# Patient Record
Sex: MALE | Race: WHITE | NOT HISPANIC OR LATINO | Employment: OTHER | URBAN - METROPOLITAN AREA
[De-identification: names, ages, dates, MRNs, and addresses within clinical notes are randomized per-mention and may not be internally consistent; named-entity substitution may affect disease eponyms.]

---

## 2017-01-17 DIAGNOSIS — E78.5 HYPERLIPIDEMIA LDL GOAL <100: Primary | ICD-10-CM

## 2017-01-17 NOTE — TELEPHONE ENCOUNTER
atorvastatin      Last Written Prescription Date: 11/09/2015  Last Fill Quantity: 90, # refills: 3  Last Office Visit with G, P or [facility] prescribing provider: 08/24/2016       CHOL      216   11/9/2015  HDL       37   11/9/2015  LDL   Cannot estimate LDL when triglyceride exceeds 400 mg/dL   11/9/2015  LDL      111   11/9/2015  TRIG      523   11/9/2015  CHOLHDLRATIO      5.8   11/9/2015

## 2017-01-20 RX ORDER — ATORVASTATIN CALCIUM 20 MG/1
20 TABLET, FILM COATED ORAL DAILY
Qty: 90 TABLET | Refills: 0 | Status: SHIPPED | OUTPATIENT
Start: 2017-01-20 | End: 2017-05-17

## 2017-02-06 DIAGNOSIS — E11.9 TYPE 2 DIABETES MELLITUS WITHOUT COMPLICATIONS (H): Primary | ICD-10-CM

## 2017-02-06 NOTE — TELEPHONE ENCOUNTER
metformin         Last Written Prescription Date: 01/06/2016  Last Fill Quantity: 120, # refills: 12  Last Office Visit with G, P or [facility] prescribing provider:  08/24/2016        BP Readings from Last 3 Encounters:   08/24/16 108/74   04/13/16 113/76   11/09/15 109/68     MICROL       28   4/13/2016  No results found for this basename: microalbumin  CREATININE   Date Value Ref Range Status   11/09/2015 0.55* 0.66 - 1.25 mg/dL Final   ]  GFR ESTIMATE   Date Value Ref Range Status   11/09/2015 >90  Non  GFR Calc   >60 mL/min/1.7m2 Final   09/23/2013 >90 >60 mL/min/1.7m2 Final   12/21/2011 >90 >60 mL/min/1.7m2 Final     GFR ESTIMATE IF BLACK   Date Value Ref Range Status   11/09/2015 >90   GFR Calc   >60 mL/min/1.7m2 Final   09/23/2013 >90 >60 mL/min/1.7m2 Final   12/21/2011 >90 >60 mL/min/1.7m2 Final     CHOL      216   11/9/2015  HDL       37   11/9/2015  LDL   Cannot estimate LDL when triglyceride exceeds 400 mg/dL   11/9/2015  LDL      111   11/9/2015  TRIG      523   11/9/2015  CHOLHDLRATIO      5.8   11/9/2015  AST       36   4/20/2006  ALT       44   9/2/2014  A1C      8.1   8/24/2016  A1C      8.3   4/13/2016  A1C     11.2   11/9/2015  A1C      8.0   9/2/2014  A1C      7.7   1/24/2014  POTASSIUM   Date Value Ref Range Status   11/09/2015 3.8 3.4 - 5.3 mmol/L Final       Jonah LOW (R)

## 2017-02-07 RX ORDER — METFORMIN HCL 500 MG
1000 TABLET, EXTENDED RELEASE 24 HR ORAL 2 TIMES DAILY WITH MEALS
Qty: 120 TABLET | Refills: 1 | Status: SHIPPED | OUTPATIENT
Start: 2017-02-07 | End: 2017-05-18

## 2017-02-07 NOTE — TELEPHONE ENCOUNTER
Medication is being filled for 1 time refill only due to:  Refill given to cover until appt in Feb 2017. [email or web address]

## 2017-02-15 DIAGNOSIS — E11.9 TYPE 2 DIABETES MELLITUS WITHOUT COMPLICATIONS (H): Primary | ICD-10-CM

## 2017-02-15 NOTE — TELEPHONE ENCOUNTER
Blood glucose monitoring (NO BRAND SPECIFIED) test strip      Last Written Prescription Date: 01/06/2016  Last Fill Quantity: 120, # refills: 12  Last Office Visit with G, P or [facility] prescribing provider:  08/24/2016        BP Readings from Last 3 Encounters:   08/24/16 108/74   04/13/16 113/76   11/09/15 109/68     Lab Results   Component Value Date    MICROL 28 04/13/2016     No results found for: MICROALBUMIN  Creatinine   Date Value Ref Range Status   11/09/2015 0.55 (L) 0.66 - 1.25 mg/dL Final   ]  GFR Estimate   Date Value Ref Range Status   11/09/2015 >90  Non  GFR Calc   >60 mL/min/1.7m2 Final   09/23/2013 >90 >60 mL/min/1.7m2 Final   12/21/2011 >90 >60 mL/min/1.7m2 Final     GFR Estimate If Black   Date Value Ref Range Status   11/09/2015 >90   GFR Calc   >60 mL/min/1.7m2 Final   09/23/2013 >90 >60 mL/min/1.7m2 Final   12/21/2011 >90 >60 mL/min/1.7m2 Final     Lab Results   Component Value Date    CHOL 216 11/09/2015     Lab Results   Component Value Date    HDL 37 11/09/2015     Lab Results   Component Value Date    LDL  11/09/2015     Cannot estimate LDL when triglyceride exceeds 400 mg/dL     11/09/2015     Lab Results   Component Value Date    TRIG 523 11/09/2015     Lab Results   Component Value Date    CHOLHDLRATIO 5.8 11/09/2015     Lab Results   Component Value Date    AST 36 04/20/2006     Lab Results   Component Value Date    ALT 44 09/02/2014     Lab Results   Component Value Date    A1C 8.1 08/24/2016    A1C 8.3 04/13/2016    A1C 11.2 11/09/2015    A1C 8.0 09/02/2014    A1C 7.7 01/24/2014     Potassium   Date Value Ref Range Status   11/09/2015 3.8 3.4 - 5.3 mmol/L Final

## 2017-02-21 NOTE — TELEPHONE ENCOUNTER
Prescription approved per [facility] [place].  kPavelRN

## 2017-04-25 ENCOUNTER — TELEPHONE (OUTPATIENT)
Dept: OTHER | Facility: CLINIC | Age: 42
End: 2017-04-25

## 2017-04-25 NOTE — TELEPHONE ENCOUNTER
4/25/2017    Call Regarding Onboarding [employer]    Attempt 1    Message     Comments: spouse and 1 child    Patient busy ok to call spouse per patient      Outreach   cnt

## 2017-05-01 NOTE — TELEPHONE ENCOUNTER
"5/1/2017          Patient busy declined Onboarding offer, will call Healthcare insurance if need help    Can \"Evette\" Quoc  Central Scheduler        "

## 2017-05-15 ENCOUNTER — TELEPHONE (OUTPATIENT)
Dept: FAMILY MEDICINE | Facility: CLINIC | Age: 42
End: 2017-05-15

## 2017-05-15 DIAGNOSIS — E78.5 HYPERLIPIDEMIA LDL GOAL <100: ICD-10-CM

## 2017-05-15 DIAGNOSIS — E11.9 TYPE 2 DIABETES MELLITUS WITHOUT COMPLICATIONS (H): Primary | ICD-10-CM

## 2017-05-15 DIAGNOSIS — R94.5 NONSPECIFIC ABNORMAL RESULTS OF LIVER FUNCTION STUDY: ICD-10-CM

## 2017-05-15 NOTE — TELEPHONE ENCOUNTER
Reason for Call: Request for an order or referral:    Order or referral being requested: Pt is coming in 05/16/17 for labs and orders need to be placed please.  Pt also needs to know if he should be fasting?      Date needed: at your convenience    Has the patient been seen by the PCP for this problem? NO    Additional comments:     Phone number Patient can be reached at:  Cell number on file:    Telephone Information:   Mobile [phone]     Best Time:  any    Can we leave a detailed message on this number?  YES    Call taken on 5/15/2017 at 1:16 PM by Gloria Cherry

## 2017-05-16 DIAGNOSIS — R94.5 NONSPECIFIC ABNORMAL RESULTS OF LIVER FUNCTION STUDY: ICD-10-CM

## 2017-05-16 DIAGNOSIS — E78.5 HYPERLIPIDEMIA LDL GOAL <100: ICD-10-CM

## 2017-05-16 DIAGNOSIS — E11.9 TYPE 2 DIABETES MELLITUS WITHOUT COMPLICATIONS (H): ICD-10-CM

## 2017-05-16 LAB
ALBUMIN SERPL-MCNC: 3.9 G/DL (ref 3.4–5)
ALP SERPL-CCNC: 95 U/L (ref 40–150)
ALT SERPL W P-5'-P-CCNC: 45 U/L (ref 0–70)
ANION GAP SERPL CALCULATED.3IONS-SCNC: 7 MMOL/L (ref 3–14)
AST SERPL W P-5'-P-CCNC: 26 U/L (ref 0–45)
BILIRUB SERPL-MCNC: 0.5 MG/DL (ref 0.2–1.3)
BUN SERPL-MCNC: 11 MG/DL (ref 7–30)
CALCIUM SERPL-MCNC: 8.7 MG/DL (ref 8.5–10.1)
CHLORIDE SERPL-SCNC: 101 MMOL/L (ref 94–109)
CHOLEST SERPL-MCNC: 210 MG/DL
CO2 SERPL-SCNC: 27 MMOL/L (ref 20–32)
CREAT SERPL-MCNC: 0.57 MG/DL (ref 0.66–1.25)
CREAT UR-MCNC: 214 MG/DL
GFR SERPL CREATININE-BSD FRML MDRD: ABNORMAL ML/MIN/1.7M2
GLUCOSE SERPL-MCNC: 248 MG/DL (ref 70–99)
HBA1C MFR BLD: 9.1 % (ref 4.3–6)
HDLC SERPL-MCNC: 32 MG/DL
LDLC SERPL CALC-MCNC: ABNORMAL MG/DL
LDLC SERPL DIRECT ASSAY-MCNC: 81 MG/DL
MICROALBUMIN UR-MCNC: 20 MG/L
MICROALBUMIN/CREAT UR: 9.11 MG/G CR (ref 0–17)
NONHDLC SERPL-MCNC: 178 MG/DL
POTASSIUM SERPL-SCNC: 3.6 MMOL/L (ref 3.4–5.3)
PROT SERPL-MCNC: 6.6 G/DL (ref 6.8–8.8)
SODIUM SERPL-SCNC: 135 MMOL/L (ref 133–144)
TRIGL SERPL-MCNC: 740 MG/DL

## 2017-05-16 PROCEDURE — 80061 LIPID PANEL: CPT | Performed by: FAMILY MEDICINE

## 2017-05-16 PROCEDURE — 80053 COMPREHEN METABOLIC PANEL: CPT | Performed by: FAMILY MEDICINE

## 2017-05-16 PROCEDURE — 82043 UR ALBUMIN QUANTITATIVE: CPT | Performed by: FAMILY MEDICINE

## 2017-05-16 PROCEDURE — 36415 COLL VENOUS BLD VENIPUNCTURE: CPT | Performed by: FAMILY MEDICINE

## 2017-05-16 PROCEDURE — 83721 ASSAY OF BLOOD LIPOPROTEIN: CPT | Mod: 59 | Performed by: FAMILY MEDICINE

## 2017-05-16 PROCEDURE — 83036 HEMOGLOBIN GLYCOSYLATED A1C: CPT | Performed by: FAMILY MEDICINE

## 2017-05-17 ENCOUNTER — OFFICE VISIT (OUTPATIENT)
Dept: FAMILY MEDICINE | Facility: CLINIC | Age: 42
End: 2017-05-17
Payer: COMMERCIAL

## 2017-05-17 VITALS
HEIGHT: 65 IN | DIASTOLIC BLOOD PRESSURE: 76 MMHG | SYSTOLIC BLOOD PRESSURE: 119 MMHG | TEMPERATURE: 98.2 F | BODY MASS INDEX: 27.82 KG/M2 | RESPIRATION RATE: 18 BRPM | WEIGHT: 167 LBS | HEART RATE: 77 BPM

## 2017-05-17 DIAGNOSIS — E78.5 HYPERLIPIDEMIA LDL GOAL <100: ICD-10-CM

## 2017-05-17 DIAGNOSIS — E11.9 TYPE 2 DIABETES MELLITUS WITHOUT COMPLICATION, WITHOUT LONG-TERM CURRENT USE OF INSULIN (H): ICD-10-CM

## 2017-05-17 PROCEDURE — 99214 OFFICE O/P EST MOD 30 MIN: CPT | Performed by: FAMILY MEDICINE

## 2017-05-17 RX ORDER — GLIPIZIDE 10 MG/1
10 TABLET, FILM COATED, EXTENDED RELEASE ORAL DAILY
Qty: 90 TABLET | Refills: 3 | Status: SHIPPED | OUTPATIENT
Start: 2017-05-17 | End: 2017-11-10

## 2017-05-17 RX ORDER — ATORVASTATIN CALCIUM 20 MG/1
20 TABLET, FILM COATED ORAL DAILY
Qty: 90 TABLET | Refills: 3 | Status: SHIPPED | OUTPATIENT
Start: 2017-05-17 | End: 2017-11-10

## 2017-05-17 NOTE — PROGRESS NOTES
SUBJECTIVE:                                                    Aubrey Ochoa is a 41 year old male who presents to clinic today for the following health issues:      Diabetes Follow-up      Patient is checking blood sugars: not at all    Diabetic concerns: None     Symptoms of hypoglycemia (low blood sugar): none     Paresthesias (numbness or burning in feet) or sores: No     Date of last diabetic eye exam: NO     Hyperlipidemia Follow-Up      Rate your low fat/cholesterol diet?: good    Taking statin?  Yes, no muscle aches from statin    Other lipid medications/supplements?:  none       Amount of exercise or physical activity: None    Problems taking medications regularly: No    Medication side effects: none    Diet: carbohydrate counting          Problem list and histories reviewed & adjusted, as indicated.  Additional history:         Reviewed and updated as needed this visit by clinical staff  Tobacco  Allergies  Meds       Reviewed and updated as needed this visit by Provider      Further history obtained, clarified or corrected by physician:  His diabetes is uncontrolled again. He is having trouble affording test strips. He has said that his diet has worsened in the last few months.    OBJECTIVE:  LUNGS: clear to auscultation, normal breath sounds  CV: RRR without murmur  ABD: BS+, soft, nontender, no masses, no hepatosplenomegaly  EXTREMITIES: without joint tenderness, swelling or erythema.  No muscle tenderness or abnormality.  SKIN: No rashes or abnormalities  NEURO:non focal exam    ASSESSMENT:     Hyperlipidemia LDL goal <100  Type 2 diabetes mellitus without complication, without long-term current use of insulin (H)    PLAN:  Orders Placed This Encounter     DIABETES EDUCATOR REFERRAL     atorvastatin (LIPITOR) 20 MG tablet     glipiZIDE (GLIPIZIDE XL) 10 MG 24 hr tablet     He needs to adjust his lifestyle some but likely he needs change in medication and perhaps consider insulin so we will have him  see the diabetic educator

## 2017-05-17 NOTE — MR AVS SNAPSHOT
After Visit Summary   5/17/2017    Aubrey Ochoa    MRN: [id number]           Patient Information     Date Of Birth          1975        Visit Information        Provider Department      5/17/2017 7:40 AM Waqar Reddy MD [facility]        Today's Diagnoses     Hyperlipidemia LDL goal <100        Type 2 diabetes mellitus without complication, without long-term current use of insulin (H)          Care Instructions          Thank you for choosing [facility] [place].  You may be receiving a survey in the mail from [employer] regarding your visit today.  Please take a few minutes to complete and return the survey to let us know how we are doing.      Our Clinic hours are:  Mondays    7:20 am - 7 pm  Tues -  Fri  7:20 am - 5 pm    Clinic Phone: [phone]    The clinic lab opens at 7:30 am Mon - Fri and appointments are required.    [facility]. [phone]  Monday-Thursday 8 am - 7pm  Tues/Wed/Fri 8 am - 5:30 pm               Follow-ups after your visit        Additional Services     DIABETES EDUCATOR REFERRAL       DIABETES SELF MANAGEMENT TRAINING (DSMT)      Your provider has referred you to Diabetes Education: [facility]: Diabetes Education - All [facility] [place] [phone]   [email or web address]    Type of training and number of hours: Previous Diagnosis: Follow-up DSMT - 2 hours.    Medicare covers: 10 hours of initial DSMT in 12 month period from the time of first visit, plus 2 hours of follow-up DSMT annually, and additional hours as requested for insulin training.    Diabetes Type: Type 2 - On Oral Medication             Diabetes Co-Morbidities:                A1C Goal:  <7.0       A1C is: Lab Results       Component                Value               Date                       A1C                      9.1                 05/16/2017              If an urgent visit is needed or A1C is above 12, Care Team to  call the Diabetes Education Team at [phone] or send an In Basket message to the Diabetes Education Pool (P DIAB ED-PATIENT CARE).    Diabetes Education Topics: Medication Start:     Special Educational Needs Requiring Individual DSMT:        MEDICAL NUTRITION THERAPY (MNT) for Diabetes    Medical Nutrition Therapy with a Registered Dietitian can be provided in coordination with Diabetes Self-Management Training to assist in achieving optimal diabetes management.    MNT Type and Hours:                        Medicare will cover: 3 hours initial MNT in 12 month period after first visit, plus 2 hours of follow-up MNT annually    Please be aware that coverage of these services is subject to the terms and limitations of your health insurance plan.  Call member services at your health plan to determine Diabetes Self-Management Training benefits and ask which blood glucose monitor brands are covered by your plan.      Please bring the following with you to your appointment:    (1)  List of current medications   (2)  List of Blood Glucose Monitor brands that are covered by your insurance plan  (3)  Blood Glucose Monitor and log book  (4)   Food records for the 3 days prior to your visit    The Certified Diabetes Educator may make diabetes medication adjustments per the CDE Protocol and Collaborative Practice Agreement.                  Who to contact     If you have questions or need follow up information about today's clinic visit or your schedule please contact [facility] directly at [phone].  Normal or non-critical lab and imaging results will be communicated to you by [employer], letter or phone within 4 business days after the clinic has received the results. If you do not hear from us within 7 days, please contact the clinic through [employer] or phone. If you have a critical or abnormal lab result, we will notify you by phone as soon as possible.  Submit refill requests through [employer] or  "call your pharmacy and they will forward the refill request to us. Please allow 3 business days for your refill to be completed.          Additional Information About Your Visit        Social & [employer] Information     [employer] gives you secure access to your electronic health record. If you see a primary care provider, you can also send messages to your care team and make appointments. If you have questions, please call your primary care clinic.  If you do not have a primary care provider, please call [phone] and they will assist you.        Care EveryWhere ID     This is your Care EveryWhere ID. This could be used by other organizations to access your [place] medical records  [id number]        Your Vitals Were     Pulse Temperature Respirations Height BMI (Body Mass Index)       77 98.2  F (36.8  C) 18 5' 5.25\" (1.657 m) 27.58 kg/m2        Blood Pressure from Last 3 Encounters:   05/17/17 119/76   08/24/16 108/74   04/13/16 113/76    Weight from Last 3 Encounters:   05/17/17 167 lb (75.8 kg)   08/24/16 160 lb (72.6 kg)   04/13/16 165 lb (74.8 kg)              We Performed the Following     DIABETES EDUCATOR REFERRAL          Today's Medication Changes          These changes are accurate as of: 5/17/17 11:03 AM.  If you have any questions, ask your nurse or doctor.               These medicines have changed or have updated prescriptions.        Dose/Directions    atorvastatin 20 MG tablet   Commonly known as:  LIPITOR   This may have changed:  additional instructions   Used for:  Hyperlipidemia LDL goal <100   Changed by:  Waqar Reddy MD        Dose:  20 mg   Take 1 tablet (20 mg) by mouth daily   Quantity:  90 tablet   Refills:  3            Where to get your medicines      These medications were sent to [facility] [address] [place] [address] - [address]   [address] [facility] [address]     Phone:  [phone]     atorvastatin 20 MG tablet    glipiZIDE 10 MG 24 hr tablet                Primary " Care Provider Office Phone # Fax #    Waqar Reddy [phone]       [facility] [address]  [facility] [address]        Thank you!     Thank you for choosing [facility]  for your care. Our goal is always to provide you with excellent care. Hearing back from our patients is one way we can continue to improve our services. Please take a few minutes to complete the written survey that you may receive in the mail after your visit with us. Thank you!             Your Updated Medication List - Protect others around you: Learn how to safely use, store and throw away your medicines at www.disposemymeds.org.          This list is accurate as of: 5/17/17 11:03 AM.  Always use your most recent med list.                   Brand Name Dispense Instructions for use    atorvastatin 20 MG tablet    LIPITOR    90 tablet    Take 1 tablet (20 mg) by mouth daily       * blood glucose monitoring lancets     1 Box    Use to test blood sugars 1 times daily or as directed.       * blood glucose monitoring lancets     102 Box    1 each daily Use to test blood sugar 1 times daily or as directed.       * blood glucose monitoring test strip    no brand specified    100 each    Use to test blood sugar 2 times daily or as directed.       * blood glucose monitoring test strip    ACCU-CHEK SMARTVIEW    120 each    Use to test blood sugar 4 times daily or as directed.       glipiZIDE 10 MG 24 hr tablet    glipiZIDE XL    90 tablet    Take 1 tablet (10 mg) by mouth daily       metFORMIN 500 MG 24 hr tablet    GLUCOPHAGE-XR    120 tablet    Take 2 tablets (1,000 mg) by mouth 2 times daily (with meals) A1C DUE 2/2017       order for DME     1 Device    Superfeet inserts size 9       * Notice:  This list has 4 medication(s) that are the same as other medications prescribed for you. Read the directions carefully, and ask your doctor or other care provider to review them with you.

## 2017-05-17 NOTE — PATIENT INSTRUCTIONS
Thank you for choosing [facility].  You may be receiving a survey in the mail from [facility] regarding your visit today.  Please take a few minutes to complete and return the survey to let us know how we are doing.      Our Clinic hours are:  Mondays    7:20 am - 7 pm  Tues -  Fri  7:20 am - 5 pm    Clinic Phone: [phone]    The clinic lab opens at 7:30 am Mon - Fri and appointments are required.    [facility]. [phone]  Monday-Thursday 8 am - 7pm  Tues/Wed/Fri 8 am - 5:30 pm

## 2017-05-18 DIAGNOSIS — E11.9 TYPE 2 DIABETES MELLITUS WITHOUT COMPLICATIONS (H): ICD-10-CM

## 2017-05-18 RX ORDER — METFORMIN HCL 500 MG
1000 TABLET, EXTENDED RELEASE 24 HR ORAL 2 TIMES DAILY WITH MEALS
Qty: 120 TABLET | Refills: 2 | Status: SHIPPED | OUTPATIENT
Start: 2017-05-18 | End: 2017-09-27

## 2017-05-18 NOTE — TELEPHONE ENCOUNTER
Metformin        Last Written Prescription Date: 02/07/17  Last Fill Quantity: 120, # refills: 1  [place] [facility] prescribing provider:  05/17/17        BP Readings from Last 3 Encounters:   05/17/17 119/76   08/24/16 108/74   04/13/16 113/76     Lab Results   Component Value Date    MICROL 20 05/16/2017     Lab Results   Component Value Date    UMALCR 9.11 05/16/2017     Creatinine   Date Value Ref Range Status   05/16/2017 0.57 (L) 0.66 - 1.25 mg/dL Final   ]  GFR Estimate   Date Value Ref Range Status   05/16/2017 >90  Non  GFR Calc   >60 mL/min/1.7m2 Final   11/09/2015 >90  Non  GFR Calc   >60 mL/min/1.7m2 Final   09/23/2013 >90 >60 mL/min/1.7m2 Final     GFR Estimate If Black   Date Value Ref Range Status   05/16/2017 >90   GFR Calc   >60 mL/min/1.7m2 Final   11/09/2015 >90   GFR Calc   >60 mL/min/1.7m2 Final   09/23/2013 >90 >60 mL/min/1.7m2 Final     Lab Results   Component Value Date    CHOL 210 05/16/2017     Lab Results   Component Value Date    HDL 32 05/16/2017     Lab Results   Component Value Date    LDL  05/16/2017     Cannot estimate LDL when triglyceride exceeds 400 mg/dL    LDL 81 05/16/2017     Lab Results   Component Value Date    TRIG 740 05/16/2017     Lab Results   Component Value Date    CHOLHDLRATIO 5.8 11/09/2015     Lab Results   Component Value Date    AST 26 05/16/2017     Lab Results   Component Value Date    ALT 45 05/16/2017     Lab Results   Component Value Date    A1C 9.1 05/16/2017    A1C 8.1 08/24/2016    A1C 8.3 04/13/2016    A1C 11.2 11/09/2015    A1C 8.0 09/02/2014     Potassium   Date Value Ref Range Status   05/16/2017 3.6 3.4 - 5.3 mmol/L Final

## 2017-07-05 ENCOUNTER — TELEPHONE (OUTPATIENT)
Dept: EDUCATION SERVICES | Facility: CLINIC | Age: 42
End: 2017-07-05

## 2017-07-05 ENCOUNTER — ALLIED HEALTH/NURSE VISIT (OUTPATIENT)
Dept: EDUCATION SERVICES | Facility: CLINIC | Age: 42
End: 2017-07-05
Payer: COMMERCIAL

## 2017-07-05 DIAGNOSIS — E11.9 TYPE 2 DIABETES MELLITUS WITHOUT COMPLICATION, WITHOUT LONG-TERM CURRENT USE OF INSULIN (H): Primary | ICD-10-CM

## 2017-07-05 DIAGNOSIS — E11.9 DIABETES MELLITUS WITHOUT COMPLICATION (H): ICD-10-CM

## 2017-07-05 PROCEDURE — G0108 DIAB MANAGE TRN  PER INDIV: HCPCS

## 2017-07-05 RX ORDER — LIRAGLUTIDE 6 MG/ML
1.2 INJECTION SUBCUTANEOUS DAILY
Qty: 6 ML | Refills: 1 | Status: SHIPPED | OUTPATIENT
Start: 2017-07-05 | End: 2020-08-31

## 2017-07-05 NOTE — PATIENT INSTRUCTIONS
My Diabetes Care Goals:    Healthy Eating: mindful of the portions of your meals and timing, to try to get proteins all meals and work on the carb choices.  Increase fibers, fruits, veggies, mixed nuts.      Monitoring:  Check every morning.    Medications:  Metformin  mg take all 4 pills   Glipizide XL 10 mg take daily with breakfast.   Trulicity 0.75 mg if covered take once weekly.    Victoza 1.2 mg .  Start out with 0.6 mg daily X 7 days then on day 8 move to 1.2 mg and stay there.      Follow up:  Follow-up diabetes education appointment scheduled on Wed. Aug. 9 @ 3:00.      Bring blood glucose meter and logbook with you to all doctor and follow-up appointments.     [place] [facility] for the [facility]:  For Your Diabetes Education and Nutrition Appointments Call:  [phone]   For Diabetes Education or Nutrition Related Questions:   Phone: [phone]  E-mail: [email or web address]  Fax: [phone]   If you need a medication refill please contact your pharmacy. Please allow 3 business days for your refills to be completed.    Instructions for emailing the Diabetes Educators    If you need to communicate a non-urgent message to a Diabetes Educator via email, please send to [email or web address].    Please follow the following email guidelines:    Subject line: Secure: your clinic name (example: Secure: [facility])  In the email please include: First name, middle initial, last name and date of birth.    We will be in touch with you within one (1) business day.

## 2017-07-05 NOTE — PROGRESS NOTES
Diabetes Self Management Training: Follow-up Visit    Aubrey Ochoa presents today for education, evaluation of glucose control and modification of medication(s) related to Type 2 diabetes.    He is accompanied by self    Patient's diabetes management related comments/concerns: levels are still high and would like to see about other meds, assess diet and what changes can be made.    Patient would like this visit to be focused around the following diabetes-related behaviors and goals: Healthy Eating, Being Active, Monitoring and Taking Medication    ASSESSMENT:  Patient Problem List reviewed for relevant medical history and current medical status.    Current Diabetes Management per Patient:  Taking diabetes medications?   yes:     Diabetes Medication(s)     Biguanides Sig    metFORMIN (GLUCOPHAGE-XR) 500 MG 24 hr tablet Take 2 tablets (1,000 mg) by mouth 2 times daily (with meals)    Sulfonylureas Sig    glipiZIDE (GLIPIZIDE XL) 10 MG 24 hr tablet Take 1 tablet (10 mg) by mouth daily      , Oral Medications: Glipizide - Dose: XL 10, Time: bedtime and Metformin - Dose:  mg takes , Time: breakfast and evening    *Abbreviated insulin dose documentation key: Insulin Trade Name ([id number]-vdeaw-nxplgk-bedtime) - i.e. Humalog 5-5-5-0 (Humalog 5 units at breakfast, 5 units at lunch, and 5 units at dinner).    Patient glucose self monitoring as follows: rarely.   BG meter: Accu-chek Tammie meter this is no longer covered by his insurance, new meter is:  One Touch Verio Flex  BG results: not available     BG values are: unable to assess  Patient's most recent   Lab Results   Component Value Date    A1C 9.1 05/16/2017    is not meeting goal of <7.0    Nutrition:  Patient eats 3 meals per day    Breakfast - Banana, Peanut butter on bread, or a hot dog,   Lunch - BBQ pork, chicken melt with a bun,    Dinner - 6 oz steak broccoli , mashed potates.   [place] ice tea.  Salad,    Snacks - ice cream sandwich.      Beverages:  "water, milk, some beers when he goes out    Cultural/Muslim diet restrictions: No     Biggest Challenge to Healthy Eating: knowing what to eat    Physical Activity:    Type: swimming at least twice per week and free weights.      Diabetes Complications:  Not discussed today.    Vitals:  There were no vitals taken for this visit.  Estimated body mass index is 27.58 kg/(m^2) as calculated from the following:    Height as of 5/17/17: 1.657 m (5' 5.25\").    Weight as of 5/17/17: 75.8 kg (167 lb).   Last 3 BP:   BP Readings from Last 3 Encounters:   05/17/17 119/76   08/24/16 108/74   04/13/16 113/76       History   Smoking Status     Former Smoker     Types: Cigarettes, Cigars   Smokeless Tobacco     Former User     Types: Chew     Quit date: 5/17/2015     Comment: occ cigar  weekly, 1-2 tins a week       Labs:  Lab Results   Component Value Date    A1C 9.1 05/16/2017     Lab Results   Component Value Date     05/16/2017     Lab Results   Component Value Date    LDL  05/16/2017     Cannot estimate LDL when triglyceride exceeds 400 mg/dL    LDL 81 05/16/2017     HDL Cholesterol   Date Value Ref Range Status   05/16/2017 32 (L) >39 mg/dL Final   ]  GFR Estimate   Date Value Ref Range Status   05/16/2017 >90  Non  GFR Calc   >60 mL/min/1.7m2 Final     GFR Estimate If Black   Date Value Ref Range Status   05/16/2017 >90   GFR Calc   >60 mL/min/1.7m2 Final     Lab Results   Component Value Date    CR 0.57 05/16/2017     No results found for: MICROALBUMIN    Health Beliefs and Attitudes:   Patient Activation Measure Survey Score:  ABAD Score (Last Two) 12/21/2011   ABAD Raw Score 39   Activation Score 56.4   ABAD Level 3       Stage of Change: PREPARATION (Decided to change - considering how)    Progress toward meeting diabetes-related behavioral goals:      Diabetes knowledge and skills assessment:     Patient is knowledgeable in diabetes management concepts related to: Taking " Medication    Patient needs further education on the following diabetes management concepts: Healthy Eating, Being Active, Monitoring, Taking Medication, Problem Solving, Reducing Risks and Healthy Coping    Barriers to Learning Assessment: No Barriers identified    Based on learning assessment above, most appropriate setting for further diabetes education would be: Individual setting.    INTERVENTION:    Education provided today on:  AADE Self-Care Behaviors:  Healthy Eating: carbohydrate counting, consistency in amount, composition, and timing of food intake, weight reduction, heart healthy diet, eating out, portion control, plate planning method and label reading  Being Active: relationship to blood glucose and describe appropriate activity program  Monitoring: purpose, proper technique, log and interpret results, individual blood glucose targets and frequency of monitoring  Taking Medication: action of prescribed medication, drawing up, administering and storing injectable diabetes medications, proper site selection and rotation for injections, side effects of prescribed medications and when to take medications  Patient was instructed on Akray meter and was able to provide an accurate return demonstration. Patient's blood glucose reading today was  mg/dL.    Opportunities for ongoing education and support in diabetes-self management were discussed.    Pt verbalized understanding of concepts discussed and recommendations provided today.       Education Materials Provided:  [place] Understanding Diabetes Booklet, Carbohydrate Counting and Medication Information on Trulicity and Victoza    PLAN:  See Patient Instructions for co-developed, patient-stated behavior change goals.  AVS printed and provided to patient today.    FOLLOW-UP:  Follow-up appointment scheduled on Aug. 9.  Chart routed to referring provider.    Ongoing plan for education and support: Follow-up visit with diabetes educator in     Raina Corbett  RN/SHELLI [place] Diabetes Educator    Time Spent: 60 minutes  Encounter Type: Individual    Any diabetes medication dose changes were made via the CDE Protocol and Collaborative Practice Agreement with the patient's primary care provider. A copy of this encounter was shared with the provider.

## 2017-07-05 NOTE — TELEPHONE ENCOUNTER
Met with Aubrey to get him on the right path again.  He has not been testing this yr.  Has a new meter where he can afford his strips.  Also wanting to start him on Victoza or Trulicity, depending on insurance.  Showed him how to give both.   A GLP-1 will be a better medication for him with his crazy hrs. And foods he eats.  We did discuss better choices and less fatty foods knowing his triglycerides.  He will then be due for another A1C in Oct to see how he is doing.  I am following up with him next month to evaluate how he feels.    Please let me know if ok with this plan.    Raina Corbett RN/SHELLI  [place] Diabetes Educator

## 2017-07-05 NOTE — MR AVS SNAPSHOT
After Visit Summary   7/5/2017    Aubrey Ochoa    MRN: [id number]           Patient Information     Date Of Birth          1975        Visit Information        Provider Department      7/5/2017 3:00 PM  DIABETIC ED RESOURCE [facility]        Today's Diagnoses     Type 2 diabetes mellitus without complication, without long-term current use of insulin (H)    -  1      Care Instructions    My Diabetes Care Goals:    Healthy Eating: mindful of the portions of your meals and timing, to try to get proteins all meals and work on the carb choices.  Increase fibers, fruits, veggies, mixed nuts.      Monitoring:  Check every morning.    Medications:  Metformin  mg take all 4 pills   Glipizide XL 10 mg take daily with breakfast.   Trulicity 0.75 mg if covered take once weekly.    Victoza 1.2 mg .  Start out with 0.6 mg daily X 7 days then on day 8 move to 1.2 mg and stay there.      Follow up:  Follow-up diabetes education appointment scheduled on Wed. Aug. 9 @ 3:00.      Bring blood glucose meter and logbook with you to all doctor and follow-up appointments.     [employer] Diabetes Education and Nutrition Services for the [facility]:  For Your Diabetes Education and Nutrition Appointments Call:  [phone]   For Diabetes Education or Nutrition Related Questions:   Phone: [phone]  E-mail: [email or web address]  Fax: [phone]   If you need a medication refill please contact your pharmacy. Please allow 3 business days for your refills to be completed.    Instructions for emailing the Diabetes Educators    If you need to communicate a non-urgent message to a Diabetes Educator via email, please send to [email or web address].    Please follow the following email guidelines:    Subject line: Secure: your clinic name (example: Secure: [facility])  In the email please include: First name, middle initial, last name and date of birth.    We will be in touch with you within  one (1) business day.             Follow-ups after your visit        Your next 10 appointments already scheduled     Aug 09, 2017  3:00 PM CDT   Diabetic Education with CL DIABETIC ED RESOURCE   [facility] ([facility])    [address]  [facility] [address]   [phone]              Who to contact     If you have questions or need follow up information about today's clinic visit or your schedule please contact [facility] directly at [phone].  Normal or non-critical lab and imaging results will be communicated to you by [employer], letter or phone within 4 business days after the clinic has received the results. If you do not hear from us within 7 days, please contact the clinic through [employer] or phone. If you have a critical or abnormal lab result, we will notify you by phone as soon as possible.  Submit refill requests through [employer] or call your pharmacy and they will forward the refill request to us. Please allow 3 business days for your refill to be completed.          Additional Information About Your Visit        MyChart Information     [employer] gives you secure access to your electronic health record. If you see a primary care provider, you can also send messages to your care team and make appointments. If you have questions, please call your primary care clinic.  If you do not have a primary care provider, please call [phone] and they will assist you.        Care EveryWhere ID     This is your Care EveryWhere ID. This could be used by other organizations to access your [facility] medical records  [id number]         Blood Pressure from Last 3 Encounters:   05/17/17 119/76   08/24/16 108/74   04/13/16 113/76    Weight from Last 3 Encounters:   05/17/17 75.8 kg (167 lb)   08/24/16 72.6 kg (160 lb)   04/13/16 74.8 kg (165 lb)              Today, you had the following     No orders found for display         Today's Medication Changes           These changes are accurate as of: 7/5/17  4:20 PM.  If you have any questions, ask your nurse or doctor.               Start taking these medicines.        Dose/Directions    dulaglutide 0.75 MG/0.5ML pen   Commonly known as:  TRULICITY   Used for:  Type 2 diabetes mellitus without complication, without long-term current use of insulin (H)        Dose:  0.75 mg   Inject 0.75 mg Subcutaneous every 7 days   Quantity:  2 mL   Refills:  1       insulin pen needle 32G X 4 MM   Commonly known as:  BD NALINI U/F   Used for:  Type 2 diabetes mellitus without complication, without long-term current use of insulin (H)        Use 1 daily as directed.   Quantity:  100 each   Refills:  11       liraglutide 18 MG/3ML soln   Commonly known as:  VICTOZA   Used for:  Type 2 diabetes mellitus without complication, without long-term current use of insulin (H)        Dose:  1.2 mg   Inject 1.2 mg Subcutaneous daily   Quantity:  6 mL   Refills:  1         These medicines have changed or have updated prescriptions.        Dose/Directions    * blood glucose monitoring test strip   Commonly known as:  no brand specified   This may have changed:    - Another medication with the same name was added. Make sure you understand how and when to take each.  - Another medication with the same name was changed. Make sure you understand how and when to take each.   Used for:  Type 2 diabetes mellitus without complications (H)        Use to test blood sugar 2 times daily or as directed.   Quantity:  100 each   Refills:  12       * blood glucose monitoring test strip   Commonly known as:  ONE TOUCH VERIO IQ   This may have changed:  additional instructions   Used for:  Type 2 diabetes mellitus without complication, without long-term current use of insulin (H)        Use to test blood sugar 2 times daily or as directed.  Ok to substitute alternative if insurance prefers.   Quantity:  100 strip   Refills:  11       * blood glucose monitoring test  strip   Commonly known as:  no brand specified   This may have changed:  You were already taking a medication with the same name, and this prescription was added. Make sure you understand how and when to take each.   Used for:  Type 2 diabetes mellitus without complication, without long-term current use of insulin (H)        Dose:  1 strip   1 strip by In Vitro route daily Use to test blood sugar 2 times daily or as directed.   Quantity:  2 Box   Refills:  11       * Notice:  This list has 3 medication(s) that are the same as other medications prescribed for you. Read the directions carefully, and ask your doctor or other care provider to review them with you.         Where to get your medicines      These medications were sent to [facility] [address] [place] [address]     Phone:  [phone]     blood glucose monitoring test strip    dulaglutide 0.75 MG/0.5ML pen    insulin pen needle 32G X 4 MM    liraglutide 18 MG/3ML soln         These medications were sent to [facility] [address] [place] [address] [address]   [address] [facility] [address]     Phone:  [phone]     blood glucose monitoring test strip                Primary Care Provider Office Phone # Fax #    Waqar Reddy [phone]       [facility] [address] [facility] [address]        Equal Access to Services     [facility] AH: Hadii caitlin ku hadasho Soomaali, waaxda luqadaha, qaybta kaalmada adeegyada, mari king. So [facility] [phone].    ATENCIÓN: Si habla español, tiene a alaniz disposición servicios gratuitos de asistencia lingüística. Llame al [phone].    We comply with applicable federal civil rights laws and [place] laws. We do not discriminate on the basis of race, color, national origin, age, disability sex, sexual orientation or gender identity.            Thank you!      Thank you for choosing [facility]  for your care. Our goal is always to provide you with excellent care. Hearing back from our patients is one way we can continue to improve our services. Please take a few minutes to complete the written survey that you may receive in the mail after your visit with us. Thank you!             Your Updated Medication List - Protect others around you: Learn how to safely use, store and throw away your medicines at www.disposemymeds.org.          This list is accurate as of: 7/5/17  4:20 PM.  Always use your most recent med list.                   Brand Name Dispense Instructions for use Diagnosis    atorvastatin 20 MG tablet    LIPITOR    90 tablet    Take 1 tablet (20 mg) by mouth daily    Hyperlipidemia LDL goal <100       * blood glucose monitoring lancets     1 Box    Use to test blood sugars 1 times daily or as directed.    Type 2 diabetes mellitus without complications (H)       * blood glucose monitoring lancets     102 Box    1 each daily Use to test blood sugar 1 times daily or as directed.    Type 2 diabetes mellitus without complications (H)       * blood glucose monitoring test strip    no brand specified    100 each    Use to test blood sugar 2 times daily or as directed.    Type 2 diabetes mellitus without complications (H)       * blood glucose monitoring test strip    ONE TOUCH VERIO IQ    100 strip    Use to test blood sugar 2 times daily or as directed.  Ok to substitute alternative if insurance prefers.    Type 2 diabetes mellitus without complication, without long-term current use of insulin (H)       * blood glucose monitoring test strip    no brand specified    2 Box    1 strip by In Vitro route daily Use to test blood sugar 2 times daily or as directed.    Type 2 diabetes mellitus without complication, without long-term current use of insulin (H)       dulaglutide 0.75 MG/0.5ML pen    TRULICITY    2 mL    Inject 0.75 mg Subcutaneous every 7  days    Type 2 diabetes mellitus without complication, without long-term current use of insulin (H)       glipiZIDE 10 MG 24 hr tablet    glipiZIDE XL    90 tablet    Take 1 tablet (10 mg) by mouth daily    Type 2 diabetes mellitus without complication, without long-term current use of insulin (H)       insulin pen needle 32G X 4 MM    BD NALINI U/F    100 each    Use 1 daily as directed.    Type 2 diabetes mellitus without complication, without long-term current use of insulin (H)       liraglutide 18 MG/3ML soln    VICTOZA    6 mL    Inject 1.2 mg Subcutaneous daily    Type 2 diabetes mellitus without complication, without long-term current use of insulin (H)       metFORMIN 500 MG 24 hr tablet    GLUCOPHAGE-XR    120 tablet    Take 2 tablets (1,000 mg) by mouth 2 times daily (with meals)    Type 2 diabetes mellitus without complications (H)       order for DME     1 Device    Superfeet inserts size 9    Plantar fascial fibromatosis       * Notice:  This list has 5 medication(s) that are the same as other medications prescribed for you. Read the directions carefully, and ask your doctor or other care provider to review them with you.

## 2017-09-27 DIAGNOSIS — E11.9 TYPE 2 DIABETES MELLITUS WITHOUT COMPLICATIONS (H): ICD-10-CM

## 2017-09-27 RX ORDER — METFORMIN HCL 500 MG
TABLET, EXTENDED RELEASE 24 HR ORAL
Qty: 120 TABLET | Refills: 0 | Status: SHIPPED | OUTPATIENT
Start: 2017-09-27 | End: 2017-11-10

## 2017-09-27 NOTE — TELEPHONE ENCOUNTER
Metformin         Last Written Prescription Date: 08/14/17  Last Fill Quantity: 120, # refills: 2  Last Office Visit with [facility] or [facility] prescribing provider:  05/17/17        BP Readings from Last 3 Encounters:   05/17/17 119/76   08/24/16 108/74   04/13/16 113/76     Lab Results   Component Value Date    MICROL 20 05/16/2017     Lab Results   Component Value Date    UMALCR 9.11 05/16/2017     Creatinine   Date Value Ref Range Status   05/16/2017 0.57 (L) 0.66 - 1.25 mg/dL Final   ]  GFR Estimate   Date Value Ref Range Status   05/16/2017 >90  Non  GFR Calc   >60 mL/min/1.7m2 Final   11/09/2015 >90  Non  GFR Calc   >60 mL/min/1.7m2 Final   09/23/2013 >90 >60 mL/min/1.7m2 Final     GFR Estimate If Black   Date Value Ref Range Status   05/16/2017 >90   GFR Calc   >60 mL/min/1.7m2 Final   11/09/2015 >90   GFR Calc   >60 mL/min/1.7m2 Final   09/23/2013 >90 >60 mL/min/1.7m2 Final     Lab Results   Component Value Date    CHOL 210 05/16/2017     Lab Results   Component Value Date    HDL 32 05/16/2017     Lab Results   Component Value Date    LDL  05/16/2017     Cannot estimate LDL when triglyceride exceeds 400 mg/dL    LDL 81 05/16/2017     Lab Results   Component Value Date    TRIG 740 05/16/2017     Lab Results   Component Value Date    CHOLHDLRATIO 5.8 11/09/2015     Lab Results   Component Value Date    AST 26 05/16/2017     Lab Results   Component Value Date    ALT 45 05/16/2017     Lab Results   Component Value Date    A1C 9.1 05/16/2017    A1C 8.1 08/24/2016    A1C 8.3 04/13/2016    A1C 11.2 11/09/2015    A1C 8.0 09/02/2014     Potassium   Date Value Ref Range Status   05/16/2017 3.6 3.4 - 5.3 mmol/L Final

## 2017-11-10 ENCOUNTER — OFFICE VISIT (OUTPATIENT)
Dept: FAMILY MEDICINE | Facility: CLINIC | Age: 42
End: 2017-11-10
Payer: COMMERCIAL

## 2017-11-10 VITALS
SYSTOLIC BLOOD PRESSURE: 112 MMHG | WEIGHT: 160 LBS | DIASTOLIC BLOOD PRESSURE: 71 MMHG | BODY MASS INDEX: 26.66 KG/M2 | RESPIRATION RATE: 18 BRPM | HEIGHT: 65 IN | HEART RATE: 78 BPM | TEMPERATURE: 98.3 F

## 2017-11-10 DIAGNOSIS — E11.9 TYPE 2 DIABETES MELLITUS WITHOUT COMPLICATION, WITHOUT LONG-TERM CURRENT USE OF INSULIN (H): ICD-10-CM

## 2017-11-10 DIAGNOSIS — E78.5 HYPERLIPIDEMIA LDL GOAL <100: ICD-10-CM

## 2017-11-10 DIAGNOSIS — Z82.49 FAMILY HISTORY OF ISCHEMIC HEART DISEASE: Primary | ICD-10-CM

## 2017-11-10 LAB
CHOLEST SERPL-MCNC: 166 MG/DL
HBA1C MFR BLD: 8.7 % (ref 4.3–6)
HDLC SERPL-MCNC: 38 MG/DL
LDLC SERPL CALC-MCNC: 68 MG/DL
NONHDLC SERPL-MCNC: 128 MG/DL
TRIGL SERPL-MCNC: 300 MG/DL
TSH SERPL DL<=0.005 MIU/L-ACNC: 0.78 MU/L (ref 0.4–4)

## 2017-11-10 PROCEDURE — 83036 HEMOGLOBIN GLYCOSYLATED A1C: CPT | Performed by: FAMILY MEDICINE

## 2017-11-10 PROCEDURE — 84443 ASSAY THYROID STIM HORMONE: CPT | Performed by: FAMILY MEDICINE

## 2017-11-10 PROCEDURE — 80061 LIPID PANEL: CPT | Performed by: FAMILY MEDICINE

## 2017-11-10 PROCEDURE — 36415 COLL VENOUS BLD VENIPUNCTURE: CPT | Performed by: FAMILY MEDICINE

## 2017-11-10 PROCEDURE — 99213 OFFICE O/P EST LOW 20 MIN: CPT | Performed by: FAMILY MEDICINE

## 2017-11-10 RX ORDER — ATORVASTATIN CALCIUM 20 MG/1
20 TABLET, FILM COATED ORAL DAILY
Qty: 90 TABLET | Refills: 3 | Status: SHIPPED | OUTPATIENT
Start: 2017-11-10 | End: 2018-12-05

## 2017-11-10 RX ORDER — GLIPIZIDE 10 MG/1
10 TABLET, FILM COATED, EXTENDED RELEASE ORAL DAILY
Qty: 90 TABLET | Refills: 3 | Status: SHIPPED | OUTPATIENT
Start: 2017-11-10 | End: 2018-12-05

## 2017-11-10 RX ORDER — METFORMIN HCL 500 MG
TABLET, EXTENDED RELEASE 24 HR ORAL
Qty: 120 TABLET | Refills: 3 | Status: SHIPPED | OUTPATIENT
Start: 2017-11-10 | End: 2018-05-23

## 2017-11-10 NOTE — NURSING NOTE
"Chief Complaint   Patient presents with     Diabetes     Lipids       Initial /71  Pulse 78  Temp 98.3  F (36.8  C)  Resp 18  Ht 5' 5.25\" (1.657 m)  Wt 160 lb (72.6 kg)  BMI 26.42 kg/m2 Estimated body mass index is 26.42 kg/(m^2) as calculated from the following:    Height as of this encounter: 5' 5.25\" (1.657 m).    Weight as of this encounter: 160 lb (72.6 kg).  Medication Reconciliation: complete   Shari Khan CMA      "

## 2017-11-10 NOTE — PROGRESS NOTES
"  SUBJECTIVE:   Aubrey Ochoa is a 42 year old male who presents to clinic today for the following health issues:      Diabetes Follow-up    Patient is checking blood sugars: once daily.  Results are as follows:       am - 160-230        Diabetic concerns: None     Symptoms of hypoglycemia (low blood sugar): none     Paresthesias (numbness or burning in feet) or sores: No   Date of last diabetic eye exam: no  Hyperlipidemia Follow-Up      Rate your low fat/cholesterol diet?: good    Taking statin?  Yes, no muscle aches from statin    Other lipid medications/supplements?:  none          Amount of exercise or physical activity: 2-3 days/week for an average of 30-45 minutes    Problems taking medications regularly: No    Medication side effects: none  Diet: low fat/cholesterol, diabetic and carbohydrate counting          Problem list and histories reviewed & adjusted, as indicated.  Additional history: as documented        Reviewed and updated as needed this visit by clinical staffTobacco  Allergies  Meds  Med Hx  Surg Hx  Fam Hx  Soc Hx      Reviewed and updated as needed this visit by Provider      Further history obtained, clarified or corrected by physician:  He thinks his blood sugars have been at her than they were in the past and he is trying to watch his diet. He feels well. He has a 48-year-old sister who recently had an MI. That means his family history is not positive for MI in father and paternal aunt and sister.    OBJECTIVE:  /71  Pulse 78  Temp 98.3  F (36.8  C)  Resp 18  Ht 5' 5.25\" (1.657 m)  Wt 160 lb (72.6 kg)  BMI 26.42 kg/m2  LUNGS: clear to auscultation, normal breath sounds  CV: RRR without murmur  ABD: BS+, soft, nontender, no masses, no hepatosplenomegaly  EXTREMITIES: without joint tenderness, swelling or erythema.  No muscle tenderness or abnormality.  SKIN: No rashes or abnormalities  NEURO:non focal exam    ASSESSMENT:     Hyperlipidemia LDL goal <100  Type 2 diabetes " mellitus without complication, without long-term current use of insulin (H)  Family history of ischemic heart disease    PLAN:  He is asymptomatic for cardiovascular symptoms however he has a very strong family history as well as poorly controlled diabetes. We will have him see the cardiologist for consultation.    Orders Placed This Encounter     Hemoglobin A1c     Lipid panel reflex to direct LDL Fasting     TSH with free T4 reflex     CARDIOLOGY EVAL ADULT REFERRAL     metFORMIN (GLUCOPHAGE-XR) 500 MG 24 hr tablet     atorvastatin (LIPITOR) 20 MG tablet     glipiZIDE (GLIPIZIDE XL) 10 MG 24 hr tablet

## 2017-11-10 NOTE — MR AVS SNAPSHOT
After Visit Summary   11/10/2017    Aubrey Ochoa    MRN: [id number]           Patient Information     Date Of Birth          1975        Visit Information        Provider Department      11/10/2017 8:00 AM Waqar Reddy MD [facility]        Today's Diagnoses     Family history of ischemic heart disease    -  1    Hyperlipidemia LDL goal <100        Type 2 diabetes mellitus without complication, without long-term current use of insulin (H)          Care Instructions          Thank you for choosing [facility].  You may be receiving a survey in the mail from [facility] regarding your visit today.  Please take a few minutes to complete and return the survey to let us know how we are doing.      Our Clinic hours are:  Mondays    7:20 am - 7 pm  Tues -  Fri  7:20 am - 5 pm    Clinic Phone: [phone]    The clinic lab opens at 7:30 am Mon - Fri and appointments are required.    [facility]. [phone]  Monday-Thursday 8 am - 7pm  Tues/Wed/Fri 8 am - 5:30 pm                 Follow-ups after your visit        Additional Services     CARDIOLOGY EVAL ADULT REFERRAL       Your provider has referred you to:  [facility]: [facility] [phone]   [email or web address]    Please be aware that coverage of these services is subject to the terms and limitations of your health insurance plan.  Call member services at your health plan with any benefit or coverage questions.      Type of Referral:  New Cardiology Consult    Timeframe requested:  Within 1 month    Please bring the following to your appointment:  >>   Any x-rays, CTs or MRIs which have been performed.  Contact the facility where they were done to arrange for  prior to your scheduled appointment.    >>   List of current medications  >>   This referral request   >>   Any documents/labs given to you for this referral    "               Who to contact     If you have questions or need follow up information about today's clinic visit or your schedule please contact [facility] directly at [phone].  Normal or non-critical lab and imaging results will be communicated to you by MyChart, letter or phone within 4 business days after the clinic has received the results. If you do not hear from us within 7 days, please contact the clinic through [employer] or phone. If you have a critical or abnormal lab result, we will notify you by phone as soon as possible.  Submit refill requests through [employer] or call your pharmacy and they will forward the refill request to us. Please allow 3 business days for your refill to be completed.          Additional Information About Your Visit        [employer] Information     [employer] gives you secure access to your electronic health record. If you see a primary care provider, you can also send messages to your care team and make appointments. If you have questions, please call your primary care clinic.  If you do not have a primary care provider, please call [phone] and they will assist you.        Care EveryWhere ID     This is your Care EveryWhere ID. This could be used by other organizations to access your [facility] medical records  [id number]        Your Vitals Were     Pulse Temperature Respirations Height BMI (Body Mass Index)       78 98.3  F (36.8  C) 18 5' 5.25\" (1.657 m) 26.42 kg/m2        Blood Pressure from Last 3 Encounters:   11/10/17 112/71   05/17/17 119/76   08/24/16 108/74    Weight from Last 3 Encounters:   11/10/17 160 lb (72.6 kg)   05/17/17 167 lb (75.8 kg)   08/24/16 160 lb (72.6 kg)              We Performed the Following     CARDIOLOGY EVAL ADULT REFERRAL     Hemoglobin A1c     Lipid panel reflex to direct LDL Fasting     TSH with free T4 reflex          Today's Medication Changes          These changes are accurate as of: 11/10/17  9:14 AM.  If you have any " questions, ask your nurse or doctor.               These medicines have changed or have updated prescriptions.        Dose/Directions    metFORMIN 500 MG 24 hr tablet   Commonly known as:  GLUCOPHAGE-XR   This may have changed:  See the new instructions.   Used for:  Type 2 diabetes mellitus without complication, without long-term current use of insulin (H)   Changed by:  Waqar Reddy MD        TAKE TWO TABLETS BY MOUTH TWICE DAILY WITH MEALS   Quantity:  120 tablet   Refills:  3            Where to get your medicines      These medications were sent to [facility] [place], [place] - [address]   [address] [facility] [address]     Phone:  [phone]     atorvastatin 20 MG tablet    glipiZIDE 10 MG 24 hr tablet    metFORMIN 500 MG 24 hr tablet                Primary Care Provider Office Phone # Fax #    Waqar Reddy [phone] [address] [facility] [address]        Equal Access to Services     [facility]: Hadii aad ku hadasho Soomaali, waaxda luqadaha, qaybta kaalmada adeegyada, waxay idiin haysantanan chito cortes . So [facility] [phone].    ATENCIÓN: Si habla español, tiene a alaniz disposición servicios gratuitos de asistencia lingüística. [facility] [phone].    We comply with applicable federal civil rights laws and [place] laws. We do not discriminate on the basis of race, color, national origin, age, disability, sex, sexual orientation, or gender identity.            Thank you!     Thank you for choosing [facility]  for your care. Our goal is always to provide you with excellent care. Hearing back from our patients is one way we can continue to improve our services. Please take a few minutes to complete the written survey that you may receive in the mail after your visit with us. Thank you!             Your Updated Medication List - Protect others around you: Learn how to safely use, store and throw away your medicines at  www.disposemymeds.org.          This list is accurate as of: 11/10/17  9:14 AM.  Always use your most recent med list.                   Brand Name Dispense Instructions for use Diagnosis    atorvastatin 20 MG tablet    LIPITOR    90 tablet    Take 1 tablet (20 mg) by mouth daily    Hyperlipidemia LDL goal <100       * blood glucose monitoring lancets     1 Box    Use to test blood sugars 1 times daily or as directed.    Type 2 diabetes mellitus without complications (H)       * blood glucose monitoring lancets     102 Box    1 each daily Use to test blood sugar 1 times daily or as directed.    Type 2 diabetes mellitus without complications (H)       * blood glucose monitoring test strip    no brand specified    100 each    Use to test blood sugar 2 times daily or as directed.    Type 2 diabetes mellitus without complications (H)       * blood glucose monitoring test strip    ONETOUCH VERIO IQ    100 strip    Use to test blood sugar 2 times daily or as directed.  Ok to substitute alternative if insurance prefers.    Type 2 diabetes mellitus without complication, without long-term current use of insulin (H)       * blood glucose monitoring test strip    no brand specified    2 Box    1 strip by In Vitro route daily Use to test blood sugar 2 times daily or as directed.    Type 2 diabetes mellitus without complication, without long-term current use of insulin (H)       dulaglutide 0.75 MG/0.5ML pen    TRULICITY    2 mL    Inject 0.75 mg Subcutaneous every 7 days    Type 2 diabetes mellitus without complication, without long-term current use of insulin (H)       glipiZIDE 10 MG 24 hr tablet    glipiZIDE XL    90 tablet    Take 1 tablet (10 mg) by mouth daily    Type 2 diabetes mellitus without complication, without long-term current use of insulin (H)       insulin pen needle 32G X 4 MM    BD NALINI U/F    100 each    Use 1 daily as directed.    Type 2 diabetes mellitus without complication, without long-term current use  of insulin (H)       liraglutide 18 MG/3ML soln    VICTOZA    6 mL    Inject 1.2 mg Subcutaneous daily    Type 2 diabetes mellitus without complication, without long-term current use of insulin (H)       metFORMIN 500 MG 24 hr tablet    GLUCOPHAGE-XR    120 tablet    TAKE TWO TABLETS BY MOUTH TWICE DAILY WITH MEALS    Type 2 diabetes mellitus without complication, without long-term current use of insulin (H)       order for DME     1 Device    Superfeet inserts size 9    Plantar fascial fibromatosis       * Notice:  This list has 5 medication(s) that are the same as other medications prescribed for you. Read the directions carefully, and ask your doctor or other care provider to review them with you.

## 2017-12-06 ENCOUNTER — OFFICE VISIT (OUTPATIENT)
Dept: CARDIOLOGY | Facility: CLINIC | Age: 42
End: 2017-12-06
Attending: FAMILY MEDICINE
Payer: COMMERCIAL

## 2017-12-06 ENCOUNTER — HOSPITAL ENCOUNTER (OUTPATIENT)
Dept: CARDIOLOGY | Facility: CLINIC | Age: 42
Discharge: HOME | End: 2017-12-06
Attending: INTERNAL MEDICINE | Admitting: INTERNAL MEDICINE
Payer: COMMERCIAL

## 2017-12-06 VITALS
HEART RATE: 79 BPM | DIASTOLIC BLOOD PRESSURE: 82 MMHG | WEIGHT: 160 LBS | OXYGEN SATURATION: 97 % | SYSTOLIC BLOOD PRESSURE: 124 MMHG | BODY MASS INDEX: 26.42 KG/M2

## 2017-12-06 DIAGNOSIS — Z82.49 FAMILY HISTORY OF HEART DISEASE: Primary | ICD-10-CM

## 2017-12-06 DIAGNOSIS — Z82.49 FAMILY HISTORY OF HEART DISEASE: ICD-10-CM

## 2017-12-06 PROCEDURE — 99204 OFFICE O/P NEW MOD 45 MIN: CPT | Mod: 25 | Performed by: INTERNAL MEDICINE

## 2017-12-06 PROCEDURE — 93005 ELECTROCARDIOGRAM TRACING: CPT

## 2017-12-06 PROCEDURE — 93010 ELECTROCARDIOGRAM REPORT: CPT | Performed by: INTERNAL MEDICINE

## 2017-12-06 NOTE — PROGRESS NOTES
CARDIOLOGY CONSULT    REASON FOR CONSULT: Risk factor modification, family history of coronary disease    PRIMARY CARE PHYSICIAN:  Waqar Rdedy    HISTORY OF PRESENT ILLNESS:  42-year-old male seen for risk factor modification.  He has history of dyslipidemia and diabetes.     In  he was seen for chest pain and mild troponin elevation. Coronary angiogram  showed normal arteries. Echo  showed EF 60%, normal right ventricle, no valve disease.     Patient has had type 2 diabetes for about 2 years.  His blood sugars continue to run slightly elevated.  He has been taking atorvastatin for many years.    He denies any concerning symptoms at this time.  He will do some light to moderate activity with no shortness of breath or chest pain.  He denies any lightheadedness, dizziness, or syncope.  No lower extremity edema or weight changes.  He is a nonsmoker, he smoked a little when he was much younger.      His father had a heart attack at age 43, CABG age 46,  in his 50s from a stroke.  Sister had heart attack age 47.    PAST MEDICAL HISTORY:  Past Medical History:   Diagnosis Date     Pure hypercholesterolemia    Diabetes type II    MEDICATIONS:  Current Outpatient Prescriptions   Medication     metFORMIN (GLUCOPHAGE-XR) 500 MG 24 hr tablet     atorvastatin (LIPITOR) 20 MG tablet     glipiZIDE (GLIPIZIDE XL) 10 MG 24 hr tablet     blood glucose monitoring (ONE TOUCH VERIO IQ) test strip     blood glucose monitoring (NO BRAND SPECIFIED) test strip     insulin pen needle (BD NALINI U/F) 32G X 4 MM     blood glucose monitoring (ACCU-CHEK FASTCLIX) lancets     blood glucose monitoring (NO BRAND SPECIFIED) test strip     blood glucose monitoring (ONE TOUCH DELICA) lancets     ORDER FOR DME     dulaglutide (TRULICITY) 0.75 MG/0.5ML pen     liraglutide (VICTOZA) 18 MG/3ML soln     No current facility-administered medications for this visit.        ALLERGIES:  No Known Allergies    SOCIAL HISTORY:  I have reviewed  this patient's social history and updated it with pertinent information if needed. Aubrey Ochoa  reports that he has quit smoking. His smoking use included Cigarettes and Cigars. His smokeless tobacco use includes Chew. He reports that he drinks alcohol. He reports that he does not use illicit drugs.    FAMILY HISTORY:  I have reviewed this patient's family history and updated it with pertinent information if needed.   Family History   Problem Relation Age of Onset     Lipids Mother      DIABETES Father      CEREBROVASCULAR DISEASE Father      HEART DISEASE Father      Psychotic Disorder Sister      depression     Respiratory Daughter      cleft a rare      Neurologic Disorder Maternal Grandmother       age 50 brain tumor       REVIEW OF SYSTEMS:  Constitutional:  No weight loss, fever, chills, weakness or fatigue.  HEENT:  Eyes:  No visual loss, blurred vision, double vision or yellow sclerae. No hearing loss, sneezing, congestion, runny nose or sore throat.  Skin:  No rash or itching.  Cardiovascular: per HPI  Respiratory: per HPI  GI:  No anorexia, nausea, vomiting or diarrhea. No abdominal pain or blood.  :  No dysurea, hematuria  Neurologic:  No headache, dizziness, syncope, paralysis, ataxia, numbness or tingling in the extremities. No change in bowel or bladder control.  Musculoskeletal:  No muscle, back pain, joint pain or stiffness.  Hematologic:  No anemia, bleeding or bruising.  Lymphatics:  No enlarged nodes. No history of splenectomy.  Psychiatric:  No history of depression or anxiety.  Endocrine:  No reports of sweating, cold or heat intolerance. No polyuria or polydipsia.  Allergies:  No history of asthma, hives, eczema or rhinitis.    PHYSICAL EXAM:      BP: 124/82 Pulse: 79     SpO2: 97 %      Vital Signs with Ranges  Pulse:  [79] 79  BP: (124)/(82) 124/82  SpO2:  [97 %] 97 %  160 lbs 0 oz    Constitutional: awake, alert, no distress  Eyes: PERRL, sclera nonicteric  ENT: trachea  midline  Respiratory: Lungs clear  Cardiac: Regular rate and rhythm, no murmurs  GI: nondistended, nontender, bowel sounds present  Lymph/Hematologic: no lymphadenopathy  Skin: dry, no rash  Musculoskeletal: good muscle tone, strength 5/5 in upper and lower extremities  Neurologic: no focal deficits  Neuropsychiatric: appropriate affact    DATA:  Labs:   November 2017: A1c 8.7, cholesterol 166, triglycerides 300, HDL 38, LDL 68     EKG: December 6, 2017, sinus rhythm, normal axis and intervals, no Q waves     ASSESSMENT:  42-year-old male seen for risk factor modification and family history of coronary disease.  Clinically he has no concerning symptoms.  He has a very strong family history in his father and sister.  His other main risk factor is type 2 diabetes, which is still not quite optimally controlled.  Otherwise he is a nonsmoker and blood pressure is normal.    A calcium score was recommended.  Generally males in their 40s have a score of 0.  If his score is minimally elevated, would continue with ongoing medical therapy.  If his score is very elevated, would consider doing stress testing now and periodically.  He should also be on low-dose aspirin with his family history and diabetes.      RECOMMENDATIONS:  1. Strong family history of premature coronary artery disease   - coronary calcium scan, consider stress test if score is very elevated  - Start aspirin 81 mg daily    2. Diabetes type 2  - Working with his primary physician for better blood sugar control    Follow-up as needed based on test results.    Ricardo Ge MD  Cardiology - [facility]  Pager:  [phone]  Text Page  December 6, 2017

## 2017-12-06 NOTE — MR AVS SNAPSHOT
"              After Visit Summary   12/6/2017    Aubrey Ochoa    MRN: [id number]           Patient Information     Date Of Birth          1975        Visit Information        Provider Department      12/6/2017 9:00 AM Ricardo Ge MD [facility]        Today's Diagnoses     Family history of heart disease    -  1      Care Instructions    Getting Ready for a CT Coronary Calcium Scan  What is this test?  A calcium scoring CT (computed tomography) scan is a painless, highly sensitive test that shows the amount of calcium build-up in your heart arteries. This tells us your future risk for heart attack.  Any plaque that has started to form in the heart arteries will show up on the CT.  A \"calcium score\" is then calculated and is compared to others of your age and gender.  This test does not show the percent blockage in any given artery, but rather a \"global burden\" of plaque.  If the calcium score is very high, then other testing such as stress testing is sometimes recommended.  Will my insurance cover it?  Please check with your insurance plan. This is a screening test, which may or may not be covered by insurance.   How do I get ready for my exam?  It is best to avoid caffeine on the day of your test.   The entire exam will take about 30 minutes or less.   Be sure to tell your doctor:    If there s any chance you are pregnant.     If you have any special needs.  What happens during the exam?  Please wear loose clothing, such as a sweat suit or jogging clothes. Avoid snaps, zippers and other metal. We may ask you to undress and put on a hospital gown.     You will lie on a table that will move through the scanner. The scanner is a short tube.    You will not be enclosed. The scan takes only a few minutes.    You must lie very still during the scans and follow breathing instructions.  Is it safe?  As with any scan that uses radiation, there is a very small " increased risk of cancer. Your doctor orders a scan when he or she feels the potential benefits outweigh the risks of the scan. Please talk with your doctor if you have any concerns before your exam.  When will I know the results?  You should discuss your test with your family doctor. He or she can go over the results with you. If needed, he or she can also suggest treatment or lifestyle changes.              Follow-ups after your visit        Future tests that were ordered for you today     Open Future Orders        Priority Expected Expires Ordered    CT Coronary Calcium Scan Routine 12/7/2017 12/6/2018 12/6/2017    EKG 12-LEAD CLINIC READ ([facility])- to be scheduled Routine 12/6/2017 12/6/2018 12/6/2017            Who to contact     If you have questions or need follow up information about today's clinic visit or your schedule please contact [facility] directly at [phone].  Normal or non-critical lab and imaging results will be communicated to you by [employer], letter or phone within 4 business days after the clinic has received the results. If you do not hear from us within 7 days, please contact the clinic through [employer] or phone. If you have a critical or abnormal lab result, we will notify you by phone as soon as possible.  Submit refill requests through [employer] or call your pharmacy and they will forward the refill request to us. Please allow 3 business days for your refill to be completed.          Additional Information About Your Visit        [employer] Information     [employer] gives you secure access to your electronic health record. If you see a primary care provider, you can also send messages to your care team and make appointments. If you have questions, please call your primary care clinic.  If you do not have a primary care provider, please call [phone] and they will assist you.        Care EveryWhere ID     This is your Care EveryWhere ID.  This could be used by other organizations to access your [facility] medical records  [id number]        Your Vitals Were     Pulse Pulse Oximetry BMI (Body Mass Index)             79 97% 26.42 kg/m2          Blood Pressure from Last 3 Encounters:   12/06/17 124/82   11/10/17 112/71   05/17/17 119/76    Weight from Last 3 Encounters:   12/06/17 72.6 kg (160 lb)   11/10/17 72.6 kg (160 lb)   05/17/17 75.8 kg (167 lb)               Primary Care Provider Office Phone # Fax #    Waqar Reddy [phone]       [address] [facility] [address]        Equal Access to Services     VENITA NEGRON : Hadii caitlin thompson hadasho Soomaali, waaxda luqadaha, qaybta kaalmada adeegyada, mari cortes . So [facility] [phone].    ATENCIÓN: Si habla español, tiene a alaniz disposición servicios gratuitos de asistencia lingüística. [facility] [phone].    We comply with applicable federal civil rights laws and [place] laws. We do not discriminate on the basis of race, color, national origin, age, disability, sex, sexual orientation, or gender identity.            Thank you!     Thank you for choosing [facility]  for your care. Our goal is always to provide you with excellent care. Hearing back from our patients is one way we can continue to improve our services. Please take a few minutes to complete the written survey that you may receive in the mail after your visit with us. Thank you!             Your Updated Medication List - Protect others around you: Learn how to safely use, store and throw away your medicines at www.disposemymeds.org.          This list is accurate as of: 12/6/17  9:16 AM.  Always use your most recent med list.                   Brand Name Dispense Instructions for use Diagnosis    atorvastatin 20 MG tablet    LIPITOR    90 tablet    Take 1 tablet (20 mg) by mouth daily    Hyperlipidemia LDL goal <100       * blood glucose  monitoring lancets     1 Box    Use to test blood sugars 1 times daily or as directed.    Type 2 diabetes mellitus without complications (H)       * blood glucose monitoring lancets     102 Box    1 each daily Use to test blood sugar 1 times daily or as directed.    Type 2 diabetes mellitus without complications (H)       * blood glucose monitoring test strip    no brand specified    100 each    Use to test blood sugar 2 times daily or as directed.    Type 2 diabetes mellitus without complications (H)       * blood glucose monitoring test strip    ONETOUCH VERIO IQ    100 strip    Use to test blood sugar 2 times daily or as directed.  Ok to substitute alternative if insurance prefers.    Type 2 diabetes mellitus without complication, without long-term current use of insulin (H)       * blood glucose monitoring test strip    no brand specified    2 Box    1 strip by In Vitro route daily Use to test blood sugar 2 times daily or as directed.    Type 2 diabetes mellitus without complication, without long-term current use of insulin (H)       dulaglutide 0.75 MG/0.5ML pen    TRULICITY    2 mL    Inject 0.75 mg Subcutaneous every 7 days    Type 2 diabetes mellitus without complication, without long-term current use of insulin (H)       glipiZIDE 10 MG 24 hr tablet    glipiZIDE XL    90 tablet    Take 1 tablet (10 mg) by mouth daily    Type 2 diabetes mellitus without complication, without long-term current use of insulin (H)       insulin pen needle 32G X 4 MM    BD NALINI U/F    100 each    Use 1 daily as directed.    Type 2 diabetes mellitus without complication, without long-term current use of insulin (H)       liraglutide 18 MG/3ML soln    VICTOZA    6 mL    Inject 1.2 mg Subcutaneous daily    Type 2 diabetes mellitus without complication, without long-term current use of insulin (H)       metFORMIN 500 MG 24 hr tablet    GLUCOPHAGE-XR    120 tablet    TAKE TWO TABLETS BY MOUTH TWICE DAILY WITH MEALS    Type 2 diabetes  mellitus without complication, without long-term current use of insulin (H)       order for DME     1 Device    Superfeet inserts size 9    Plantar fascial fibromatosis       * Notice:  This list has 5 medication(s) that are the same as other medications prescribed for you. Read the directions carefully, and ask your doctor or other care provider to review them with you.

## 2017-12-06 NOTE — LETTER
2017    Waqar Reddy MD  [id number] [place] [address]    RE: Aubrey Ochoa       Dear Colleague,    I had the pleasure of seeing Aubrey Ochoa in the [facility].    CARDIOLOGY CONSULT    REASON FOR CONSULT: Risk factor modification, family history of coronary disease    PRIMARY CARE PHYSICIAN:  Waqar Reddy    HISTORY OF PRESENT ILLNESS:  42-year-old male seen for risk factor modification.  He has history of dyslipidemia and diabetes.     In  he was seen for chest pain and mild troponin elevation. Coronary angiogram  showed normal arteries. Echo  showed EF 60%, normal right ventricle, no valve disease.     Patient has had type 2 diabetes for about 2 years.  His blood sugars continue to run slightly elevated.  He has been taking atorvastatin for many years.    He denies any concerning symptoms at this time.  He will do some light to moderate activity with no shortness of breath or chest pain.  He denies any lightheadedness, dizziness, or syncope.  No lower extremity edema or weight changes.  He is a nonsmoker, he smoked a little when he was much younger.      His father had a heart attack at age 43, CABG age 46,  in his 50s from a stroke.  Sister had heart attack age 47.    PAST MEDICAL HISTORY:  Past Medical History:   Diagnosis Date     Pure hypercholesterolemia    Diabetes type II    MEDICATIONS:  Current Outpatient Prescriptions   Medication     metFORMIN (GLUCOPHAGE-XR) 500 MG 24 hr tablet     atorvastatin (LIPITOR) 20 MG tablet     glipiZIDE (GLIPIZIDE XL) 10 MG 24 hr tablet     blood glucose monitoring (ONE TOUCH VERIO IQ) test strip     blood glucose monitoring (NO BRAND SPECIFIED) test strip     insulin pen needle (BD NALINI U/F) 32G X 4 MM     blood glucose monitoring (ACCU-CHEK FASTCLIX) lancets     blood glucose monitoring (NO BRAND SPECIFIED) test strip     blood glucose monitoring (ONE TOUCH DELICA) lancets     ORDER FOR DME     dulaglutide  (TRULICITY) 0.75 MG/0.5ML pen     liraglutide (VICTOZA) 18 MG/3ML soln     No current facility-administered medications for this visit.        ALLERGIES:  No Known Allergies    SOCIAL HISTORY:  I have reviewed this patient's social history and updated it with pertinent information if needed. Aubrey Ochoa  reports that he has quit smoking. His smoking use included Cigarettes and Cigars. His smokeless tobacco use includes Chew. He reports that he drinks alcohol. He reports that he does not use illicit drugs.    FAMILY HISTORY:  I have reviewed this patient's family history and updated it with pertinent information if needed.   Family History   Problem Relation Age of Onset     Lipids Mother      DIABETES Father      CEREBROVASCULAR DISEASE Father      HEART DISEASE Father      Psychotic Disorder Sister      depression     Respiratory Daughter      cleft a rare      Neurologic Disorder Maternal Grandmother       age 50 brain tumor       REVIEW OF SYSTEMS:  Constitutional:  No weight loss, fever, chills, weakness or fatigue.  HEENT:  Eyes:  No visual loss, blurred vision, double vision or yellow sclerae. No hearing loss, sneezing, congestion, runny nose or sore throat.  Skin:  No rash or itching.  Cardiovascular: per HPI  Respiratory: per HPI  GI:  No anorexia, nausea, vomiting or diarrhea. No abdominal pain or blood.  :  No dysurea, hematuria  Neurologic:  No headache, dizziness, syncope, paralysis, ataxia, numbness or tingling in the extremities. No change in bowel or bladder control.  Musculoskeletal:  No muscle, back pain, joint pain or stiffness.  Hematologic:  No anemia, bleeding or bruising.  Lymphatics:  No enlarged nodes. No history of splenectomy.  Psychiatric:  No history of depression or anxiety.  Endocrine:  No reports of sweating, cold or heat intolerance. No polyuria or polydipsia.  Allergies:  No history of asthma, hives, eczema or rhinitis.    PHYSICAL EXAM:      BP: 124/82 Pulse: 79     SpO2: 97  %      Vital Signs with Ranges  Pulse:  [79] 79  BP: (124)/(82) 124/82  SpO2:  [97 %] 97 %  160 lbs 0 oz    Constitutional: awake, alert, no distress  Eyes: PERRL, sclera nonicteric  ENT: trachea midline  Respiratory: Lungs clear  Cardiac: Regular rate and rhythm, no murmurs  GI: nondistended, nontender, bowel sounds present  Lymph/Hematologic: no lymphadenopathy  Skin: dry, no rash  Musculoskeletal: good muscle tone, strength 5/5 in upper and lower extremities  Neurologic: no focal deficits  Neuropsychiatric: appropriate affact    DATA:  Labs:   November 2017: A1c 8.7, cholesterol 166, triglycerides 300, HDL 38, LDL 68     EKG: December 6, 2017, sinus rhythm, normal axis and intervals, no Q waves     ASSESSMENT:  42-year-old male seen for risk factor modification and family history of coronary disease.  Clinically he has no concerning symptoms.  He has a very strong family history in his father and sister.  His other main risk factor is type 2 diabetes, which is still not quite optimally controlled.  Otherwise he is a nonsmoker and blood pressure is normal.    A calcium score was recommended.  Generally males in their 40s have a score of 0.  If his score is minimally elevated, would continue with ongoing medical therapy.  If his score is very elevated, would consider doing stress testing now and periodically.  He should also be on low-dose aspirin with his family history and diabetes.      RECOMMENDATIONS:  1. Strong family history of premature coronary artery disease   - coronary calcium scan, consider stress test if score is very elevated  - Start aspirin 81 mg daily    2. Diabetes type 2  - Working with his primary physician for better blood sugar control    Follow-up as needed based on test results.    Ricardo Ge MD  Cardiology - [facility]  Pager:  [phone]  Text Page  December 6, 2017    Thank you for allowing me to participate in the care of your patient.    Sincerely,     Ricardo Cui  MD Joo     [facility]

## 2017-12-08 ENCOUNTER — HOSPITAL ENCOUNTER (OUTPATIENT)
Dept: CT IMAGING | Facility: CLINIC | Age: 42
Discharge: HOME | End: 2017-12-08
Attending: INTERNAL MEDICINE | Admitting: INTERNAL MEDICINE
Payer: COMMERCIAL

## 2017-12-08 DIAGNOSIS — Z82.49 FAMILY HISTORY OF HEART DISEASE: ICD-10-CM

## 2017-12-08 PROCEDURE — 75571 CT HRT W/O DYE W/CA TEST: CPT | Mod: 26 | Performed by: INTERNAL MEDICINE

## 2017-12-08 PROCEDURE — 75571 CT HRT W/O DYE W/CA TEST: CPT

## 2018-03-08 ENCOUNTER — TRANSFERRED RECORDS (OUTPATIENT)
Dept: HEALTH INFORMATION MANAGEMENT | Facility: CLINIC | Age: 43
End: 2018-03-08

## 2018-05-23 DIAGNOSIS — E11.9 TYPE 2 DIABETES MELLITUS WITHOUT COMPLICATION, WITHOUT LONG-TERM CURRENT USE OF INSULIN (H): ICD-10-CM

## 2018-05-23 NOTE — TELEPHONE ENCOUNTER
"Requested Prescriptions   Pending Prescriptions Disp Refills     metFORMIN (GLUCOPHAGE-XR) 500 MG 24 hr tablet [Pharmacy Med Name: MetFORMIN ER 500MG  TAB]  Last Written Prescription Date:  11/10/17  Last Fill Quantity: 120,  # refills: 3   Last office visit: 11/10/2017 with prescribing provider:  11/10/17   Future Office Visit:     120 tablet 3     Sig: TAKE TWO TABLETS BY MOUTH TWICE DAILY WITH MEALS    Biguanide Agents Failed    5/23/2018  1:06 PM       Failed - Patient has had a Microalbumin in the past 12 mos.    Recent Labs   Lab Test  05/16/17   0750   MICROL  20   UMALCR  9.11          Failed - Patient has documented A1c within the specified period of time.    If HgbA1C is 8 or greater, it needs to be on file within the past 3 months.  If less than 8, must be on file within the past 6 months.     Recent Labs   Lab Test  11/10/17   0836   A1C  8.7*          Failed - Recent (6 mo) or future (30 days) visit within the authorizing provider's specialty    Patient had office visit in the last 6 months or has a visit in the next 30 days with authorizing provider or within the authorizing provider's specialty.  See \"Patient Info\" tab in inbasket, or \"Choose Columns\" in Meds & Orders section of the refill encounter.           Passed - Blood pressure less than 140/90 in past 6 months    BP Readings from Last 3 Encounters:   12/06/17 124/82   11/10/17 112/71   05/17/17 119/76          Passed - Patient has documented LDL within the past 12 mos.    Recent Labs   Lab Test  11/10/17   0836   LDL  68          Passed - Patient is age 10 or older       Passed - Patient's CR is NOT>1.4 OR Patient's EGFR is NOT<45 within past 12 mos.    Recent Labs   Lab Test  05/16/17   0745   GFRESTIMATED  >90  Non  GFR Calc     GFRESTBLACK  >90   GFR Calc         Recent Labs   Lab Test  05/16/17   0745   CR  0.57*            Passed - Patient does NOT have a diagnosis of CHF.          "

## 2018-05-23 NOTE — TELEPHONE ENCOUNTER
Routing refill request to provider for review/approval because:  Labs out of range:   Hemoglobin A1C 8.7 (H) 4.3 - 6.0 % Final 11/10/2017  8:36 AM 60     Labs not current:      Creatinine 0.57 (L) 0.66 - 1.25 mg/dL Final 05/16/2017  7:45 AM      Albumin Urine mg/g Cr 9.11  0 - 17 mg/g Cr Final 05/16/2017  7:50 AM      A break in medication, should have needed refill on 2/10/18.    ADE Richardson

## 2018-05-25 RX ORDER — METFORMIN HCL 500 MG
TABLET, EXTENDED RELEASE 24 HR ORAL
Qty: 120 TABLET | Refills: 3 | Status: SHIPPED | OUTPATIENT
Start: 2018-05-25 | End: 2018-12-05

## 2018-05-29 ENCOUNTER — OFFICE VISIT (OUTPATIENT)
Dept: FAMILY MEDICINE | Facility: CLINIC | Age: 43
End: 2018-05-29
Payer: COMMERCIAL

## 2018-05-29 VITALS
RESPIRATION RATE: 16 BRPM | SYSTOLIC BLOOD PRESSURE: 117 MMHG | BODY MASS INDEX: 25.83 KG/M2 | DIASTOLIC BLOOD PRESSURE: 77 MMHG | WEIGHT: 155 LBS | HEIGHT: 65 IN | TEMPERATURE: 97.9 F | HEART RATE: 86 BPM

## 2018-05-29 DIAGNOSIS — Z23 NEED FOR VACCINATION: ICD-10-CM

## 2018-05-29 DIAGNOSIS — S61.412A LACERATION OF LEFT HAND WITHOUT FOREIGN BODY, INITIAL ENCOUNTER: Primary | ICD-10-CM

## 2018-05-29 PROCEDURE — 90714 TD VACC NO PRESV 7 YRS+ IM: CPT | Performed by: NURSE PRACTITIONER

## 2018-05-29 PROCEDURE — 99213 OFFICE O/P EST LOW 20 MIN: CPT | Mod: 25 | Performed by: NURSE PRACTITIONER

## 2018-05-29 PROCEDURE — 90471 IMMUNIZATION ADMIN: CPT | Performed by: NURSE PRACTITIONER

## 2018-05-29 RX ORDER — MUPIROCIN 20 MG/G
OINTMENT TOPICAL 3 TIMES DAILY
Qty: 22 G | Refills: 0 | Status: SHIPPED | OUTPATIENT
Start: 2018-05-29 | End: 2018-06-03

## 2018-05-29 NOTE — PROGRESS NOTES
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        Problem list and histories reviewed & adjusted, as indicated.  Additional history: states he just rubbed or scrapped his left hand on a finished wooden door frame at home and didn't think much of it. Sustained a cut on his hand by his knuckles that was 4 days ago.  He hasn't tried anything for it and thought it was better until he did a lot of concrete work in [place] on . Then it became swollen and sore and seemed more red. He's worried about possible infection. States it's looking much better again today.  Is due for tetanus booster.      Patient Active Problem List   Diagnosis     Nonspecific abnormal results of liver function study     Hyperlipidemia LDL goal <100     Nose deformities, acquired     Type 2 diabetes mellitus without complications (H)     Past Surgical History:   Procedure Laterality Date     ANGIOGRAM  3/2/2007    negative cath     ORTHOPEDIC SURGERY  2007    right ankle     SURGICAL HISTORY OF -   1974    Circumcision       Social History   Substance Use Topics     Smoking status: Former Smoker     Types: Cigarettes, Cigars     Smokeless tobacco: Current User     Types: Chew      Comment: occ cigar  weekly, 1-2 tins a week     Alcohol use Yes      Comment: not daily     Family History   Problem Relation Age of Onset     Lipids Mother      DIABETES Father      CEREBROVASCULAR DISEASE Father      HEART DISEASE Father      Psychotic Disorder Sister      depression     Respiratory Daughter      cleft a rare      Neurologic Disorder Maternal Grandmother       age 50 brain tumor         Current Outpatient Prescriptions   Medication Sig Dispense Refill     atorvastatin (LIPITOR) 20 MG tablet Take 1 tablet (20 mg) by mouth daily 90 tablet 3  "    blood glucose monitoring (ACCU-CHEK FASTCLIX) lancets 1 each daily Use to test blood sugar 1 times daily or as directed. 102 Box 12     blood glucose monitoring (NO BRAND SPECIFIED) test strip 1 strip by In Vitro route daily Use to test blood sugar 2 times daily or as directed. 2 Box 11     blood glucose monitoring (NO BRAND SPECIFIED) test strip Use to test blood sugar 2 times daily or as directed. 100 each 12     blood glucose monitoring (ONE TOUCH DELICA) lancets Use to test blood sugars 1 times daily or as directed. 1 Box 12     blood glucose monitoring (ONE TOUCH VERIO IQ) test strip Use to test blood sugar 2 times daily or as directed.  Ok to substitute alternative if insurance prefers. 100 strip 11     glipiZIDE (GLIPIZIDE XL) 10 MG 24 hr tablet Take 1 tablet (10 mg) by mouth daily 90 tablet 3     insulin pen needle (BD NALINI U/F) 32G X 4 MM Use 1 daily as directed. 100 each 11     metFORMIN (GLUCOPHAGE-XR) 500 MG 24 hr tablet TAKE TWO TABLETS BY MOUTH TWICE DAILY WITH MEALS 120 tablet 3     mupirocin (BACTROBAN) 2 % ointment Apply topically 3 times daily for 5 days 22 g 0     ORDER FOR DME Superfeet inserts size 9 1 Device 0     dulaglutide (TRULICITY) 0.75 MG/0.5ML pen Inject 0.75 mg Subcutaneous every 7 days (Patient not taking: Reported on 12/6/2017) 2 mL 1     liraglutide (VICTOZA) 18 MG/3ML soln Inject 1.2 mg Subcutaneous daily (Patient not taking: Reported on 12/6/2017) 6 mL 1     No Known Allergies    Reviewed and updated as needed this visit by clinical staff  Allergies  Meds       Reviewed and updated as needed this visit by Provider          ROS: 10 point ROS neg other than the symptoms noted above in the HPI.    OBJECTIVE:     /77 (BP Location: Right arm, Patient Position: Chair, Cuff Size: Adult Regular)  Pulse 86  Temp 97.9  F (36.6  C) (Oral)  Resp 16  Ht 5' 5.25\" (1.657 m)  Wt 155 lb (70.3 kg)  BMI 25.6 kg/m2  Body mass index is 25.6 kg/(m^2).  GENERAL: healthy, alert and no " distress  NECK: no asymmetry  RESP: lungs clear to auscultation   CV: regular rate and rhythm, normal S1 S2, no S3 or S4, no murmur  MS: no gross musculoskeletal defects noted, on dorsal side of left hand at base of middle finger he has a small abrasion type laceration, minimal surrounding erythema and swelling, no discharge, FROM without limitations of all fingers and wrist, good CMS      Diagnostic Test Results:  No results found for this or any previous visit (from the past 24 hour(s)).    ASSESSMENT/PLAN:             1. Laceration of left hand without foreign body, initial encounter    - mupirocin (BACTROBAN) 2 % ointment; Apply topically 3 times daily for 5 days  Dispense: 22 g; Refill: 0  Discussed how to take the medication(s), expected outcomes, potential side effects.  Would anticipate this heals without further concern.    2. Need for vaccination    - TD PRSERV FREE >=7 YRS ADS IM [71855]  - 1st  Administration  [[id number]]    See Patient Instructions  Patient Instructions   Keep it covered when exposures are a possibility.  Apply ointment as directed.  Ice and ibuprofen as needed.  Watch for any worsening, redness, swelling or discharge.  Tetanus booster given.  Follow up if symptoms persist or worsen and as needed.        Thank you for choosing [facility].  You may be receiving a survey in the mail from [employer] regarding your visit today.  Please take a few minutes to complete and return the survey to let us know how we are doing.      Our Clinic hours are:  Mondays    7:20 am - 7 pm  Tues -  Fri  7:20 am - 5 pm    Clinic Phone: [phone]    The clinic lab opens at 7:30 am Mon - Fri and appointments are required.    [facility]  Ph. [phone]  Monday-Thursday 8 am - 7pm  Tues/Wed/Fri 8 am - 5:30 pm             MELANIE Guardado CNP  [facility]

## 2018-05-29 NOTE — MR AVS SNAPSHOT
After Visit Summary   5/29/2018    Aubrey Ochoa    MRN: [id number]           Patient Information     Date Of Birth          1975        Visit Information        Provider Department      5/29/2018 7:40 AM Estephania Glass APRN CNP [facility]        Today's Diagnoses     Laceration of left hand without foreign body, initial encounter    -  1    Need for vaccination          Care Instructions    Keep it covered when exposures are a possibility.  Apply ointment as directed.  Ice and ibuprofen as needed.  Watch for any worsening, redness, swelling or discharge.  Tetanus booster given.  Follow up if symptoms persist or worsen and as needed.        Thank you for choosing [facility].  You may be receiving a survey in the mail from [employer] regarding your visit today.  Please take a few minutes to complete and return the survey to let us know how we are doing.      Our Clinic hours are:  Mondays    7:20 am - 7 pm  Tues -  Fri  7:20 am - 5 pm    Clinic Phone: [phone]    The clinic lab opens at 7:30 am Mon - Fri and appointments are required.    [facility]  Ph. [phone]  Monday-Thursday 8 am - 7pm  Tues/Wed/Fri 8 am - 5:30 pm                 Follow-ups after your visit        Who to contact     If you have questions or need follow up information about today's clinic visit or your schedule please contact [facility] directly at [phone].  Normal or non-critical lab and imaging results will be communicated to you by MyChart, letter or phone within 4 business days after the clinic has received the results. If you do not hear from us within 7 days, please contact the clinic through MyChart or phone. If you have a critical or abnormal lab result, we will notify you by phone as soon as possible.  Submit refill requests through [employer] or call your pharmacy and they will forward the refill request to us. Please allow 3 business days  "for your refill to be completed.          Additional Information About Your Visit        MyChart Information     [email or web address] gives you secure access to your electronic health record. If you see a primary care provider, you can also send messages to your care team and make appointments. If you have questions, please call your primary care clinic.  If you do not have a primary care provider, please call [phone] and they will assist you.        Care EveryWhere ID     This is your Care EveryWhere ID. This could be used by other organizations to access your [facility] medical records  [id number]        Your Vitals Were     Pulse Temperature Respirations Height BMI (Body Mass Index)       86 97.9  F (36.6  C) (Oral) 16 5' 5.25\" (1.657 m) 25.6 kg/m2        Blood Pressure from Last 3 Encounters:   05/29/18 117/77   12/06/17 124/82   11/10/17 112/71    Weight from Last 3 Encounters:   05/29/18 155 lb (70.3 kg)   12/06/17 160 lb (72.6 kg)   11/10/17 160 lb (72.6 kg)              We Performed the Following     1st  Administration  [[id number]]     TD PRSERV FREE >=7 YRS ADS IM [[id number]]          Today's Medication Changes          These changes are accurate as of 5/29/18  8:07 AM.  If you have any questions, ask your nurse or doctor.               Start taking these medicines.        Dose/Directions    mupirocin 2 % ointment   Commonly known as:  BACTROBAN   Used for:  Laceration of left hand without foreign body, initial encounter        Apply topically 3 times daily for 5 days   Quantity:  22 g   Refills:  0            Where to get your medicines      These medications were sent to [id number] - [place], [place] - [address]  [address] [facility] [address]     Phone:  [phone]     mupirocin 2 % ointment                Primary Care Provider Office Phone # Fax #    Waqar Reddy [phone] [address] [facility] [address]        Equal Access to Services     VENITA NEGRON AH: " Hadii aad ku hadmayrao Soomaali, waaxda luqadaha, qaybta kaalmada vita, mari yazanbraden quinndenver christine. So [facility] [phone].    ATENCIÓN: Si habla keysha, tiene a alaniz disposición servicios gratuitos de asistencia lingüística. Llame al [phone].    We comply with applicable federal civil rights laws and [place] laws. We do not discriminate on the basis of race, color, national origin, age, disability, sex, sexual orientation, or gender identity.            Thank you!     Thank you for choosing [facility]  for your care. Our goal is always to provide you with excellent care. Hearing back from our patients is one way we can continue to improve our services. Please take a few minutes to complete the written survey that you may receive in the mail after your visit with us. Thank you!             Your Updated Medication List - Protect others around you: Learn how to safely use, store and throw away your medicines at www.disposemymeds.org.          This list is accurate as of 5/29/18  8:07 AM.  Always use your most recent med list.                   Brand Name Dispense Instructions for use Diagnosis    atorvastatin 20 MG tablet    LIPITOR    90 tablet    Take 1 tablet (20 mg) by mouth daily    Hyperlipidemia LDL goal <100       * blood glucose monitoring lancets     1 Box    Use to test blood sugars 1 times daily or as directed.    Type 2 diabetes mellitus without complications (H)       * blood glucose monitoring lancets     102 Box    1 each daily Use to test blood sugar 1 times daily or as directed.    Type 2 diabetes mellitus without complications (H)       * blood glucose monitoring test strip    no brand specified    100 each    Use to test blood sugar 2 times daily or as directed.    Type 2 diabetes mellitus without complications (H)       * blood glucose monitoring test strip    ONETOUCH VERIO IQ    100 strip    Use to test blood sugar 2 times daily or as directed.  Ok to  substitute alternative if insurance prefers.    Type 2 diabetes mellitus without complication, without long-term current use of insulin (H)       * blood glucose monitoring test strip    no brand specified    2 Box    1 strip by In Vitro route daily Use to test blood sugar 2 times daily or as directed.    Type 2 diabetes mellitus without complication, without long-term current use of insulin (H)       dulaglutide 0.75 MG/0.5ML pen    TRULICITY    2 mL    Inject 0.75 mg Subcutaneous every 7 days    Type 2 diabetes mellitus without complication, without long-term current use of insulin (H)       glipiZIDE 10 MG 24 hr tablet    glipiZIDE XL    90 tablet    Take 1 tablet (10 mg) by mouth daily    Type 2 diabetes mellitus without complication, without long-term current use of insulin (H)       insulin pen needle 32G X 4 MM    BD NALINI U/F    100 each    Use 1 daily as directed.    Type 2 diabetes mellitus without complication, without long-term current use of insulin (H)       liraglutide 18 MG/3ML soln    VICTOZA    6 mL    Inject 1.2 mg Subcutaneous daily    Type 2 diabetes mellitus without complication, without long-term current use of insulin (H)       metFORMIN 500 MG 24 hr tablet    GLUCOPHAGE-XR    120 tablet    TAKE TWO TABLETS BY MOUTH TWICE DAILY WITH MEALS    Type 2 diabetes mellitus without complication, without long-term current use of insulin (H)       mupirocin 2 % ointment    BACTROBAN    22 g    Apply topically 3 times daily for 5 days    Laceration of left hand without foreign body, initial encounter       order for DME     1 Device    Superfeet inserts size 9    Plantar fascial fibromatosis       * Notice:  This list has 5 medication(s) that are the same as other medications prescribed for you. Read the directions carefully, and ask your doctor or other care provider to review them with you.

## 2018-05-29 NOTE — PATIENT INSTRUCTIONS
Keep it covered when exposures are a possibility.  Apply ointment as directed.  Ice and ibuprofen as needed.  Watch for any worsening, redness, swelling or discharge.  Tetanus booster given.  Follow up if symptoms persist or worsen and as needed.        Thank you for choosing [facility].  You may be receiving a survey in the mail from [facility] regarding your visit today.  Please take a few minutes to complete and return the survey to let us know how we are doing.      Our Clinic hours are:  Mondays    7:20 am - 7 pm  Tues -  Fri  7:20 am - 5 pm    Clinic Phone: [phone]    The clinic lab opens at 7:30 am Mon - Fri and appointments are required.    [facility]. [phone]  Monday-Thursday 8 am - 7pm  Tues/Wed/Fri 8 am - 5:30 pm

## 2018-11-30 ENCOUNTER — TELEPHONE (OUTPATIENT)
Dept: FAMILY MEDICINE | Facility: CLINIC | Age: 43
End: 2018-11-30

## 2018-11-30 DIAGNOSIS — E78.5 HYPERLIPIDEMIA LDL GOAL <100: ICD-10-CM

## 2018-11-30 DIAGNOSIS — Z00.00 ENCOUNTER FOR ROUTINE ADULT HEALTH EXAMINATION WITHOUT ABNORMAL FINDINGS: Primary | ICD-10-CM

## 2018-11-30 DIAGNOSIS — E11.9 TYPE 2 DIABETES MELLITUS WITHOUT COMPLICATIONS (H): ICD-10-CM

## 2018-11-30 NOTE — TELEPHONE ENCOUNTER
Reason for Call: Request for an order or referral:    Order or referral being requested: Pt requesting orders be placed for lab appt 12/3 pt  Has appt with Dr Reddy 12/5 and is aware of fasting for labs, please place any needed lab orders, no need to call pt unless there are questions.     Date needed: before my next appointment    Has the patient been seen by the PCP for this problem? YES    Additional comments:     Phone number Patient can be reached at:  Home number on file [phone] (home)    Can we leave a detailed message on this number?  YES    Call taken on 11/30/2018 at 2:19 PM by Evette Washington

## 2018-12-03 DIAGNOSIS — Z00.00 ENCOUNTER FOR ROUTINE ADULT HEALTH EXAMINATION WITHOUT ABNORMAL FINDINGS: ICD-10-CM

## 2018-12-03 LAB
ALBUMIN SERPL-MCNC: 4 G/DL (ref 3.4–5)
ALP SERPL-CCNC: 81 U/L (ref 40–150)
ALT SERPL W P-5'-P-CCNC: 45 U/L (ref 0–70)
ANION GAP SERPL CALCULATED.3IONS-SCNC: 8 MMOL/L (ref 3–14)
AST SERPL W P-5'-P-CCNC: 31 U/L (ref 0–45)
BILIRUB SERPL-MCNC: 0.6 MG/DL (ref 0.2–1.3)
BUN SERPL-MCNC: 10 MG/DL (ref 7–30)
CALCIUM SERPL-MCNC: 8.1 MG/DL (ref 8.5–10.1)
CHLORIDE SERPL-SCNC: 105 MMOL/L (ref 94–109)
CHOLEST SERPL-MCNC: 187 MG/DL
CO2 SERPL-SCNC: 27 MMOL/L (ref 20–32)
CREAT SERPL-MCNC: 0.58 MG/DL (ref 0.66–1.25)
GFR SERPL CREATININE-BSD FRML MDRD: >90 ML/MIN/1.7M2
GLUCOSE SERPL-MCNC: 210 MG/DL (ref 70–99)
HBA1C MFR BLD: 9 % (ref 0–5.6)
HDLC SERPL-MCNC: 35 MG/DL
LDLC SERPL CALC-MCNC: ABNORMAL MG/DL
LDLC SERPL DIRECT ASSAY-MCNC: 82 MG/DL
NONHDLC SERPL-MCNC: 152 MG/DL
POTASSIUM SERPL-SCNC: 4 MMOL/L (ref 3.4–5.3)
PROT SERPL-MCNC: 7 G/DL (ref 6.8–8.8)
SODIUM SERPL-SCNC: 140 MMOL/L (ref 133–144)
TRIGL SERPL-MCNC: 484 MG/DL
TSH SERPL DL<=0.005 MIU/L-ACNC: 0.55 MU/L (ref 0.4–4)

## 2018-12-03 PROCEDURE — 84443 ASSAY THYROID STIM HORMONE: CPT | Performed by: FAMILY MEDICINE

## 2018-12-03 PROCEDURE — 80061 LIPID PANEL: CPT | Performed by: FAMILY MEDICINE

## 2018-12-03 PROCEDURE — 80053 COMPREHEN METABOLIC PANEL: CPT | Performed by: FAMILY MEDICINE

## 2018-12-03 PROCEDURE — 36415 COLL VENOUS BLD VENIPUNCTURE: CPT | Performed by: FAMILY MEDICINE

## 2018-12-03 PROCEDURE — 83036 HEMOGLOBIN GLYCOSYLATED A1C: CPT | Performed by: FAMILY MEDICINE

## 2018-12-03 PROCEDURE — 83721 ASSAY OF BLOOD LIPOPROTEIN: CPT | Mod: 59 | Performed by: FAMILY MEDICINE

## 2018-12-04 NOTE — PATIENT INSTRUCTIONS
Thank you for choosing [facility].  You may be receiving a survey in the mail from [facility] regarding your visit today.  Please take a few minutes to complete and return the survey to let us know how we are doing.      Our Clinic hours are:  Mondays    7:20 am - 7 pm  Tues -  Fri  7:20 am - 5 pm    Clinic Phone: [phone]    The clinic lab opens at 7:30 am Mon - Fri and appointments are required.    [facility]. [phone]  Monday-Thursday 8 am - 7pm  Tues/Wed/Fri 8 am - 5:30 pm          ----- Message from Samantha Smith MD sent at 12/3/2018  5:45 PM CST -----  Patient was informed of results. He is being followed up by Dr. Heredia.

## 2018-12-05 ENCOUNTER — OFFICE VISIT (OUTPATIENT)
Dept: FAMILY MEDICINE | Facility: CLINIC | Age: 43
End: 2018-12-05
Payer: COMMERCIAL

## 2018-12-05 VITALS
OXYGEN SATURATION: 97 % | HEIGHT: 65 IN | RESPIRATION RATE: 18 BRPM | SYSTOLIC BLOOD PRESSURE: 110 MMHG | HEART RATE: 77 BPM | WEIGHT: 157 LBS | DIASTOLIC BLOOD PRESSURE: 72 MMHG | TEMPERATURE: 97.3 F | BODY MASS INDEX: 26.16 KG/M2

## 2018-12-05 DIAGNOSIS — E78.5 HYPERLIPIDEMIA LDL GOAL <100: ICD-10-CM

## 2018-12-05 DIAGNOSIS — E11.9 TYPE 2 DIABETES MELLITUS WITHOUT COMPLICATION, WITHOUT LONG-TERM CURRENT USE OF INSULIN (H): ICD-10-CM

## 2018-12-05 DIAGNOSIS — S46.911A STRAIN OF RIGHT SHOULDER, INITIAL ENCOUNTER: Primary | ICD-10-CM

## 2018-12-05 PROCEDURE — 99214 OFFICE O/P EST MOD 30 MIN: CPT | Performed by: FAMILY MEDICINE

## 2018-12-05 RX ORDER — METFORMIN HCL 500 MG
TABLET, EXTENDED RELEASE 24 HR ORAL
Qty: 360 TABLET | Refills: 3 | Status: SHIPPED | OUTPATIENT
Start: 2018-12-05 | End: 2020-02-27

## 2018-12-05 RX ORDER — ATORVASTATIN CALCIUM 20 MG/1
20 TABLET, FILM COATED ORAL DAILY
Qty: 90 TABLET | Refills: 3 | Status: SHIPPED | OUTPATIENT
Start: 2018-12-05 | End: 2020-02-27

## 2018-12-05 RX ORDER — GLIPIZIDE 10 MG/1
10 TABLET, FILM COATED, EXTENDED RELEASE ORAL DAILY
Qty: 90 TABLET | Refills: 3 | Status: SHIPPED | OUTPATIENT
Start: 2018-12-05 | End: 2020-02-27

## 2018-12-05 ASSESSMENT — PAIN SCALES - GENERAL: PAINLEVEL: MILD PAIN (3)

## 2018-12-05 NOTE — PATIENT INSTRUCTIONS
Thank you for choosing [facility].  You may be receiving a survey in the mail from [facility] regarding your visit today.  Please take a few minutes to complete and return the survey to let us know how we are doing.      Our Clinic hours are:  Mondays    7:20 am - 7 pm  Tues - Fri  7:20 am - 5 pm    Clinic Phone: [phone]    The clinic lab opens at 7:30 am Mon - Fri and appointments are required.    [facility]. [phone]  Monday  8 am - 7pm  Tues - Fri 8 am - 5:30 pm

## 2018-12-05 NOTE — MR AVS SNAPSHOT
After Visit Summary   12/5/2018    Aubrey Ochoa    MRN: [id number]           Patient Information     Date Of Birth          1975        Visit Information        Provider Department      12/5/2018 7:40 AM Waqar Reddy MD [facility]        Today's Diagnoses     Strain of right shoulder, initial encounter    -  1    Type 2 diabetes mellitus without complication, without long-term current use of insulin (H)        Hyperlipidemia LDL goal <100          Care Instructions          Thank you for choosing [facility].  You may be receiving a survey in the mail from [facility] regarding your visit today.  Please take a few minutes to complete and return the survey to let us know how we are doing.      Our Clinic hours are:  Mondays    7:20 am - 7 pm  Tues -  Fri  7:20 am - 5 pm    Clinic Phone: [phone]    The clinic lab opens at 7:30 am Mon - Fri and appointments are required.    [facility]  Ph. [phone]  Monday  8 am - 7pm  Tues - Fri 8 am - 5:30 pm                 Follow-ups after your visit        Future tests that were ordered for you today     Open Future Orders        Priority Expected Expires Ordered    Hemoglobin A1c Routine 3/15/2019 12/5/2019 12/5/2018            Who to contact     If you have questions or need follow up information about today's clinic visit or your schedule please contact [facility] directly at [phone].  Normal or non-critical lab and imaging results will be communicated to you by MyChart, letter or phone within 4 business days after the clinic has received the results. If you do not hear from us within 7 days, please contact the clinic through MyChart or phone. If you have a critical or abnormal lab result, we will notify you by phone as soon as possible.  Submit refill requests through [employer] or call your pharmacy and they will forward the refill request to us. Please allow 3 business days for  "your refill to be completed.          Additional Information About Your Visit        MyChart Information     [employer] gives you secure access to your electronic health record. If you see a primary care provider, you can also send messages to your care team and make appointments. If you have questions, please call your primary care clinic.  If you do not have a primary care provider, please call [phone] and they will assist you.        Care EveryWhere ID     This is your Care EveryWhere ID. This could be used by other organizations to access your [facility] medical records  [id number]        Your Vitals Were     Pulse Temperature Respirations Height Pulse Oximetry BMI (Body Mass Index)    77 97.3  F (36.3  C) 18 5' 5.25\" (1.657 m) 97% 25.93 kg/m2       Blood Pressure from Last 3 Encounters:   12/05/18 110/72   05/29/18 117/77   12/06/17 124/82    Weight from Last 3 Encounters:   12/05/18 157 lb (71.2 kg)   05/29/18 155 lb (70.3 kg)   12/06/17 160 lb (72.6 kg)                 Where to get your medicines      These medications were sent to [facility] [address] [place] [address] [address]  [address] [address]     Phone:  [phone]     atorvastatin 20 MG tablet    glipiZIDE 10 MG 24 hr tablet    metFORMIN 500 MG 24 hr tablet          Primary Care Provider Office Phone # Fax #    Waqar BARRERALudwig Reddy [phone] [address] [facility] [address]        Equal Access to Services     [facility]: Hadii caitlin thompson hadasho Soomaali, waaxda luqadaha, qaybta kaalmada adeyaw, mari [facility] hayaan adeeg kharash la'aan . So [facility] [phone].    ATENCIÓN: Si habla español, tiene a alaniz disposición servicios gratuitos de asistencia lingüística. Llame al [phone].    We comply with applicable federal civil rights laws and [place] laws. We do not discriminate on the basis of race, color, national origin, age, disability, sex, sexual orientation, or gender identity.       "      Thank you!     Thank you for choosing [facility]  for your care. Our goal is always to provide you with excellent care. Hearing back from our patients is one way we can continue to improve our services. Please take a few minutes to complete the written survey that you may receive in the mail after your visit with us. Thank you!             Your Updated Medication List - Protect others around you: Learn how to safely use, store and throw away your medicines at www.disposemymeds.org.          This list is accurate as of 12/5/18  8:22 AM.  Always use your most recent med list.                   Brand Name Dispense Instructions for use Diagnosis    atorvastatin 20 MG tablet    LIPITOR    90 tablet    Take 1 tablet (20 mg) by mouth daily    Hyperlipidemia LDL goal <100       * blood glucose monitoring lancets     1 Box    Use to test blood sugars 1 times daily or as directed.    Type 2 diabetes mellitus without complications (H)       * blood glucose monitoring lancets     102 Box    1 each daily Use to test blood sugar 1 times daily or as directed.    Type 2 diabetes mellitus without complications (H)       * blood glucose monitoring test strip    NO BRAND SPECIFIED    100 each    Use to test blood sugar 2 times daily or as directed.    Type 2 diabetes mellitus without complications (H)       * blood glucose monitoring test strip    ONETOUCH VERIO IQ    100 strip    Use to test blood sugar 2 times daily or as directed.  Ok to substitute alternative if insurance prefers.    Type 2 diabetes mellitus without complication, without long-term current use of insulin (H)       * blood glucose monitoring test strip    NO BRAND SPECIFIED    2 Box    1 strip by In Vitro route daily Use to test blood sugar 2 times daily or as directed.    Type 2 diabetes mellitus without complication, without long-term current use of insulin (H)       dulaglutide 0.75 MG/0.5ML pen    TRULICITY    2 mL    Inject 0.75 mg  Subcutaneous every 7 days    Type 2 diabetes mellitus without complication, without long-term current use of insulin (H)       glipiZIDE 10 MG 24 hr tablet    glipiZIDE XL    90 tablet    Take 1 tablet (10 mg) by mouth daily    Type 2 diabetes mellitus without complication, without long-term current use of insulin (H)       insulin pen needle 32G X 4 MM miscellaneous    BD NALINI U/F    100 each    Use 1 daily as directed.    Type 2 diabetes mellitus without complication, without long-term current use of insulin (H)       liraglutide 18 MG/3ML solution    VICTOZA    6 mL    Inject 1.2 mg Subcutaneous daily    Type 2 diabetes mellitus without complication, without long-term current use of insulin (H)       metFORMIN 500 MG 24 hr tablet    GLUCOPHAGE-XR    360 tablet    TAKE TWO TABLETS BY MOUTH TWICE DAILY WITH MEALS    Type 2 diabetes mellitus without complication, without long-term current use of insulin (H)       order for DME     1 Device    Superfeet inserts size 9    Plantar fascial fibromatosis       * Notice:  This list has 5 medication(s) that are the same as other medications prescribed for you. Read the directions carefully, and ask your doctor or other care provider to review them with you.

## 2018-12-05 NOTE — PROGRESS NOTES
SUBJECTIVE:   Aubrey Ochoa is a 43 year old male who presents to clinic today for the following health issues:      Diabetes Follow-up      Patient is checking blood sugars: not at all    Diabetic concerns: None     Symptoms of hypoglycemia (low blood sugar): none     Paresthesias (numbness or burning in feet) or sores: No     Date of last diabetic eye exam: 4/18    BP Readings from Last 2 Encounters:   12/05/18 110/72   05/29/18 117/77     Hemoglobin A1C (%)   Date Value   12/03/2018 9.0 (H)   11/10/2017 8.7 (H)     LDL Cholesterol Calculated (mg/dL)   Date Value   12/03/2018     Cannot estimate LDL when triglyceride exceeds 400 mg/dL   11/10/2017 68     LDL Cholesterol Direct (mg/dL)   Date Value   12/03/2018 82       Diabetes Management Resources  Hyperlipidemia Follow-Up      Rate your low fat/cholesterol diet?: good    Taking statin?  Yes, no muscle aches from statin    Other lipid medications/supplements?:  none      Amount of exercise or physical activity: None    Problems taking medications regularly: No    Medication side effects: none    Diet: low fat/cholesterol and carbohydrate counting        Joint Pain    Onset: since the 1 st week in OCT     Description:   Location: right shoulder  Character: Sharp    Intensity: mild, moderate    Progression of Symptoms: better    Accompanying Signs & Symptoms:  Other symptoms: none    History:   Previous similar pain: no       Precipitating factors:   Trauma or overuse: no     Alleviating factors:  Improved by: nothing    Therapies Tried and outcome: none        Problem list and histories reviewed & adjusted, as indicated.  Additional history: as documented        Reviewed and updated as needed this visit by clinical staff  Tobacco  Allergies  Meds  Med Hx  Surg Hx  Fam Hx  Soc Hx      Reviewed and updated as needed this visit by Provider       Physician note based upon further history obtained, clarified or corrected and including examination  performed:    HPI:  He comes in for diabetes follow-up.  His A1c is up a little bit again.  He says he has lapsed in his dietary habits and lifestyle issues.  He thinks he can improve on that and would like to try.  He does not want to consider insulin at this time.  He also complains of right shoulder pain which is steadily improving and is not real painful now at all.  This started after a weekend of 4 wheeling but no specific injury.    ROS: (Except as listed above in HPI the following systems are basically negative)  General: No change in weight, sleep or appetite.  Normal energy.  No fever or chills, no excessive fatigue or daytime drowsiness  Eyes: Negative for vision changes or eye problems  ENT: No problems with ears, nose or throat.  No difficulty swallowing.  Resp: No coughing, wheezing or shortness of breath, denies apnea  CV: No chest pains or palpitations  GI: No nausea, vomiting,  heartburn, abdominal pain, diarrhea, constipation or change in bowel habits  : No urinary frequency or dysuria, bladder or kidney problems  Musculoskeletal: No significant muscle or joint pains  Neurologic: No headaches, numbness, tingling, weakness, problems with balance or coordination  Skin: No rashes,worrisome lesions or skin problems    Social History     Social History     Marital status:      Spouse name: N/A     Number of children: N/A     Years of education: N/A     Social History Main Topics     Smoking status: Former Smoker     Types: Cigarettes, Cigars     Smokeless tobacco: Current User     Types: Chew      Comment: occ cigar  weekly, 1-2 tins a week     Alcohol use Yes      Comment: not daily     Drug use: No     Sexual activity: Yes     Partners: Female     Other Topics Concern     Parent/Sibling W/ Cabg, Mi Or Angioplasty Before 65f 55m? Yes     mi WITH QUAD BYPASS     Social History Narrative       /72 (BP Location: Right arm, Patient Position: Chair, Cuff Size: Adult Regular)  Pulse 77  Temp  "97.3  F (36.3  C)  Resp 18  Ht 5' 5.25\" (1.657 m)  Wt 157 lb (71.2 kg)  SpO2 97%  BMI 25.93 kg/m2  HEAD: AT/NC  EYES: PERRLA, EOMI, Sclerae clear, Fundi normal with sharp discs  EARS: TMs clear, canals normal  NOSE & THROAT: clear   LUNGS: clear to auscultation, normal breath sounds  CV: RRR without murmur  ABD: BS+, soft, nontender, no masses, no hepatosplenomegaly  EXTREMITIES: without joint tenderness, swelling or erythema.  No muscle tenderness or abnormality.  SKIN: No rashes or abnormalities  NEURO:non focal exam    ASSESSMENT:      Type 2 diabetes mellitus without complication, without long-term current use of insulin (H)  Hyperlipidemia LDL goal <100    The diagnoses listed above were all addressed on this visit. Some may be listed just as ongoing yet needing a medication refill, in which case that was discussed and confirmed with the patient at this visit.    PLAN:  He is going to take the next 3 months and adjust his lifestyle as well as he can and then we will recheck A1c and determine next steps  Since his range of motion of his shoulder is normal he is going to just monitor that but if he has worsening range of motion or intolerable discomfort then he will return for recheck.    Orders Placed This Encounter     Hemoglobin A1c     atorvastatin (LIPITOR) 20 MG tablet     glipiZIDE (GLIPIZIDE XL) 10 MG 24 hr tablet     metFORMIN (GLUCOPHAGE-XR) 500 MG 24 hr tablet               "

## 2019-05-14 DIAGNOSIS — E11.9 TYPE 2 DIABETES MELLITUS WITHOUT COMPLICATION, WITHOUT LONG-TERM CURRENT USE OF INSULIN (H): ICD-10-CM

## 2019-05-14 LAB — HBA1C MFR BLD: 7.2 % (ref 0–5.6)

## 2019-05-14 PROCEDURE — 36415 COLL VENOUS BLD VENIPUNCTURE: CPT | Performed by: FAMILY MEDICINE

## 2019-05-14 PROCEDURE — 83036 HEMOGLOBIN GLYCOSYLATED A1C: CPT | Performed by: FAMILY MEDICINE

## 2020-02-10 ENCOUNTER — HEALTH MAINTENANCE LETTER (OUTPATIENT)
Age: 45
End: 2020-02-10

## 2020-02-24 DIAGNOSIS — E78.5 HYPERLIPIDEMIA LDL GOAL <100: ICD-10-CM

## 2020-02-24 DIAGNOSIS — E11.9 TYPE 2 DIABETES MELLITUS WITHOUT COMPLICATION, WITHOUT LONG-TERM CURRENT USE OF INSULIN (H): ICD-10-CM

## 2020-02-24 NOTE — LETTER
[facility]  [address]  [facility] [address]  Phone: [phone]        February 27, 2020      Aubrey Ochoa                                                                                                                                [id number] [facility] [address]            Dear Mr. Ochoa,    We are concerned about your health care.  We recently provided you with a medication refill.  Many medications require routine follow-up with your Doctor.      At this time we ask that: You schedule a routine office visit with your physician to follow your diabetes.  Please come fasting so we can perform blood work at your visit.    Your prescription: Has been refilled for 1 month so you may have time for the above noted follow-up.      Thank you,      Waqar Reddy MD/ mela

## 2020-02-24 NOTE — TELEPHONE ENCOUNTER
"Requested Prescriptions   Pending Prescriptions Disp Refills     glipiZIDE (GLUCOTROL XL) 10 MG 24 hr tablet [Pharmacy Med Name: glipiZIDE ER 10 MG Oral Tablet Extended Release 24 Hour]  Last Written Prescription Date:  12/5/18  Last Fill Quantity: 90,  # refills: 3   Last Office Visit with [facility], [facility] or [facility] prescribing provider:  12/5/18   Future Office Visit:       0     Sig: TAKE 1 TABLET BY MOUTH ONCE DAILY       Sulfonylurea Agents Failed - 2/24/2020 11:00 AM        Failed - Blood pressure less than 140/90 in past 6 months     BP Readings from Last 3 Encounters:   12/05/18 110/72   05/29/18 117/77   12/06/17 124/82                 Failed - Patient has documented LDL within the past 12 mos.     Recent Labs   Lab Test 12/03/18  0835   LDL Cannot estimate LDL when triglyceride exceeds 400 mg/dL  82             Failed - Patient has had a Microalbumin in the past 15 mos.     Recent Labs   Lab Test 05/16/17  0750   MICROL 20   UMALCR 9.11             Failed - Patient has documented A1c within the specified period of time.     If HgbA1C is 8 or greater, it needs to be on file within the past 3 months.  If less than 8, must be on file within the past 6 months.     Recent Labs   Lab Test 05/14/19  0844   A1C 7.2*             Failed - Patient has a recent creatinine (normal) within the past 12 mos.     Recent Labs   Lab Test 12/03/18  0835   CR 0.58*             Failed - Recent (6 mo) or future (30 days) visit within the authorizing provider's specialty     Patient had office visit in the last 6 months or has a visit in the next 30 days with authorizing provider or within the authorizing provider's specialty.  See \"Patient Info\" tab in inbasket, or \"Choose Columns\" in Meds & Orders section of the refill encounter.            Passed - Medication is active on med list        Passed - Patient is age 18 or older        atorvastatin (LIPITOR) 20 MG tablet [Pharmacy Med Name: Atorvastatin Calcium 20 MG Oral Tablet]  Last " "Written Prescription Date:  12/5/18  Last Fill Quantity: 90,  # refills: 3   Last Office Visit with [facility], [facility] or [facility] prescribing provider:  12/5/18   Future Office Visit:       0     Sig: TAKE 1 TABLET BY MOUTH ONCE DAILY       Statins Protocol Failed - 2/24/2020 11:00 AM        Failed - LDL on file in past 12 months     Recent Labs   Lab Test 12/03/18  0835   LDL Cannot estimate LDL when triglyceride exceeds 400 mg/dL  82             Failed - Recent (12 mo) or future (30 days) visit within the authorizing provider's specialty     Patient has had an office visit with the authorizing provider or a provider within the authorizing providers department within the previous 12 mos or has a future within next 30 days. See \"Patient Info\" tab in inbasket, or \"Choose Columns\" in Meds & Orders section of the refill encounter.              Passed - No abnormal creatine kinase in past 12 months     No lab results found.             Passed - Medication is active on med list        Passed - Patient is age 18 or older        metFORMIN (GLUCOPHAGE-XR) 500 MG 24 hr tablet [Pharmacy Med Name: metFORMIN HCl  MG Oral Tablet Extended Release 24 Hour]  Last Written Prescription Date:  12/5/18  Last Fill Quantity: 360,  # refills: 3   Last Office Visit with [facility], [facility] or [facility] prescribing provider:  12/5/18   Future Office Visit:       0     Sig: TAKE 2 TABLETS BY MOUTH TWICE DAILY WITH MEALS       Biguanide Agents Failed - 2/24/2020 11:00 AM        Failed - Blood pressure less than 140/90 in past 6 months     BP Readings from Last 3 Encounters:   12/05/18 110/72   05/29/18 117/77   12/06/17 124/82                 Failed - Patient has documented LDL within the past 12 mos.     Recent Labs   Lab Test 12/03/18  0835   LDL Cannot estimate LDL when triglyceride exceeds 400 mg/dL  82             Failed - Patient has had a Microalbumin in the past 15 mos.     Recent Labs   Lab Test 05/16/17  0750   MICROL 20   UMALCR 9.11            " " Failed - Patient has documented A1c within the specified period of time.     If HgbA1C is 8 or greater, it needs to be on file within the past 3 months.  If less than 8, must be on file within the past 6 months.     Recent Labs   Lab Test 05/14/19  0844   A1C 7.2*             Failed - Patient's CR is NOT>1.4 OR Patient's EGFR is NOT<45 within past 12 mos.     Recent Labs   Lab Test 12/03/18  0835   GFRESTIMATED >90   GFRESTBLACK >90       Recent Labs   Lab Test 12/03/18  0835   CR 0.58*             Failed - Recent (6 mo) or future (30 days) visit within the authorizing provider's specialty     Patient had office visit in the last 6 months or has a visit in the next 30 days with authorizing provider or within the authorizing provider's specialty.  See \"Patient Info\" tab in inbasket, or \"Choose Columns\" in Meds & Orders section of the refill encounter.            Passed - Patient is age 10 or older        Passed - Patient does NOT have a diagnosis of CHF.        Passed - Medication is active on med list          "

## 2020-02-27 RX ORDER — METFORMIN HCL 500 MG
TABLET, EXTENDED RELEASE 24 HR ORAL
Qty: 120 TABLET | Refills: 0 | Status: SHIPPED | OUTPATIENT
Start: 2020-02-27 | End: 2020-04-02

## 2020-02-27 RX ORDER — GLIPIZIDE 10 MG/1
TABLET, FILM COATED, EXTENDED RELEASE ORAL
Qty: 30 TABLET | Refills: 0 | Status: SHIPPED | OUTPATIENT
Start: 2020-02-27 | End: 2020-04-02

## 2020-02-27 RX ORDER — ATORVASTATIN CALCIUM 20 MG/1
TABLET, FILM COATED ORAL
Qty: 30 TABLET | Refills: 0 | Status: SHIPPED | OUTPATIENT
Start: 2020-02-27 | End: 2020-04-02

## 2020-02-27 NOTE — TELEPHONE ENCOUNTER
Medication is being filled for 1 time refill only due to:  Patient needs to be seen because it has been more than one year since last visit.     Katie Sherwood RN

## 2020-04-01 ENCOUNTER — MYC MEDICAL ADVICE (OUTPATIENT)
Dept: FAMILY MEDICINE | Facility: CLINIC | Age: 45
End: 2020-04-01

## 2020-04-01 DIAGNOSIS — E78.5 HYPERLIPIDEMIA LDL GOAL <100: ICD-10-CM

## 2020-04-01 DIAGNOSIS — E11.9 TYPE 2 DIABETES MELLITUS WITHOUT COMPLICATION, WITHOUT LONG-TERM CURRENT USE OF INSULIN (H): ICD-10-CM

## 2020-04-02 RX ORDER — GLIPIZIDE 10 MG/1
10 TABLET, FILM COATED, EXTENDED RELEASE ORAL DAILY
Qty: 90 TABLET | Refills: 0 | Status: SHIPPED | OUTPATIENT
Start: 2020-04-02 | End: 2020-08-24

## 2020-04-02 RX ORDER — ATORVASTATIN CALCIUM 20 MG/1
20 TABLET, FILM COATED ORAL DAILY
Qty: 90 TABLET | Refills: 0 | Status: SHIPPED | OUTPATIENT
Start: 2020-04-02 | End: 2020-08-24

## 2020-04-02 RX ORDER — METFORMIN HCL 500 MG
TABLET, EXTENDED RELEASE 24 HR ORAL
Qty: 360 TABLET | Refills: 0 | Status: SHIPPED | OUTPATIENT
Start: 2020-04-02 | End: 2020-08-24

## 2020-08-24 DIAGNOSIS — E11.9 TYPE 2 DIABETES MELLITUS WITHOUT COMPLICATION, WITHOUT LONG-TERM CURRENT USE OF INSULIN (H): ICD-10-CM

## 2020-08-24 DIAGNOSIS — E78.5 HYPERLIPIDEMIA LDL GOAL <100: ICD-10-CM

## 2020-08-24 RX ORDER — GLIPIZIDE 10 MG/1
10 TABLET, FILM COATED, EXTENDED RELEASE ORAL DAILY
Qty: 30 TABLET | Refills: 0 | Status: SHIPPED | OUTPATIENT
Start: 2020-08-24 | End: 2020-08-31

## 2020-08-24 RX ORDER — METFORMIN HCL 500 MG
TABLET, EXTENDED RELEASE 24 HR ORAL
Qty: 120 TABLET | Refills: 0 | Status: SHIPPED | OUTPATIENT
Start: 2020-08-24 | End: 2020-08-31

## 2020-08-24 RX ORDER — ATORVASTATIN CALCIUM 20 MG/1
20 TABLET, FILM COATED ORAL DAILY
Qty: 30 TABLET | Refills: 0 | Status: SHIPPED | OUTPATIENT
Start: 2020-08-24 | End: 2020-08-31

## 2020-08-24 NOTE — TELEPHONE ENCOUNTER
"One month refills per protocol until he is seen, notified him,   \"thanks\"     Coby Chisholm RNC    "

## 2020-08-24 NOTE — TELEPHONE ENCOUNTER
Patient has been our of these medications for 2 weeks. He has an appt on 8/31 with Dr. Reddy. Please expedite today.  [facility]  Clinic Station

## 2020-08-31 ENCOUNTER — OFFICE VISIT (OUTPATIENT)
Dept: FAMILY MEDICINE | Facility: CLINIC | Age: 45
End: 2020-08-31
Payer: COMMERCIAL

## 2020-08-31 VITALS
SYSTOLIC BLOOD PRESSURE: 102 MMHG | HEIGHT: 66 IN | OXYGEN SATURATION: 98 % | WEIGHT: 158 LBS | HEART RATE: 78 BPM | DIASTOLIC BLOOD PRESSURE: 60 MMHG | RESPIRATION RATE: 16 BRPM | BODY MASS INDEX: 25.39 KG/M2 | TEMPERATURE: 98.3 F

## 2020-08-31 DIAGNOSIS — E11.9 TYPE 2 DIABETES MELLITUS WITHOUT COMPLICATION, WITHOUT LONG-TERM CURRENT USE OF INSULIN (H): ICD-10-CM

## 2020-08-31 DIAGNOSIS — E78.5 HYPERLIPIDEMIA LDL GOAL <100: ICD-10-CM

## 2020-08-31 LAB
ANION GAP SERPL CALCULATED.3IONS-SCNC: 5 MMOL/L (ref 3–14)
BUN SERPL-MCNC: 12 MG/DL (ref 7–30)
CALCIUM SERPL-MCNC: 8.8 MG/DL (ref 8.5–10.1)
CHLORIDE SERPL-SCNC: 105 MMOL/L (ref 94–109)
CHOLEST SERPL-MCNC: 187 MG/DL
CO2 SERPL-SCNC: 26 MMOL/L (ref 20–32)
CREAT SERPL-MCNC: 0.56 MG/DL (ref 0.66–1.25)
GFR SERPL CREATININE-BSD FRML MDRD: >90 ML/MIN/{1.73_M2}
GLUCOSE SERPL-MCNC: 235 MG/DL (ref 70–99)
HBA1C MFR BLD: 9.4 % (ref 0–5.6)
HDLC SERPL-MCNC: 38 MG/DL
LDLC SERPL CALC-MCNC: ABNORMAL MG/DL
LDLC SERPL DIRECT ASSAY-MCNC: 73 MG/DL
NONHDLC SERPL-MCNC: 149 MG/DL
POTASSIUM SERPL-SCNC: 4.1 MMOL/L (ref 3.4–5.3)
SODIUM SERPL-SCNC: 136 MMOL/L (ref 133–144)
TRIGL SERPL-MCNC: 620 MG/DL

## 2020-08-31 PROCEDURE — 83036 HEMOGLOBIN GLYCOSYLATED A1C: CPT | Performed by: FAMILY MEDICINE

## 2020-08-31 PROCEDURE — 83721 ASSAY OF BLOOD LIPOPROTEIN: CPT | Performed by: FAMILY MEDICINE

## 2020-08-31 PROCEDURE — 80061 LIPID PANEL: CPT | Performed by: FAMILY MEDICINE

## 2020-08-31 PROCEDURE — 36415 COLL VENOUS BLD VENIPUNCTURE: CPT | Performed by: FAMILY MEDICINE

## 2020-08-31 PROCEDURE — 99214 OFFICE O/P EST MOD 30 MIN: CPT | Performed by: FAMILY MEDICINE

## 2020-08-31 PROCEDURE — 80048 BASIC METABOLIC PNL TOTAL CA: CPT | Performed by: FAMILY MEDICINE

## 2020-08-31 RX ORDER — METFORMIN HCL 500 MG
TABLET, EXTENDED RELEASE 24 HR ORAL
Qty: 360 TABLET | Refills: 3 | Status: SHIPPED | OUTPATIENT
Start: 2020-08-31 | End: 2022-04-08

## 2020-08-31 RX ORDER — GLIPIZIDE 10 MG/1
10 TABLET, FILM COATED, EXTENDED RELEASE ORAL DAILY
Qty: 90 TABLET | Refills: 3 | Status: SHIPPED | OUTPATIENT
Start: 2020-08-31 | End: 2022-04-08

## 2020-08-31 RX ORDER — ATORVASTATIN CALCIUM 20 MG/1
20 TABLET, FILM COATED ORAL DAILY
Qty: 90 TABLET | Refills: 3 | Status: SHIPPED | OUTPATIENT
Start: 2020-08-31 | End: 2022-04-08

## 2020-08-31 ASSESSMENT — MIFFLIN-ST. JEOR: SCORE: 1536.49

## 2020-08-31 NOTE — PROGRESS NOTES
Subjective     Aubrey Ochoa is a 44 year old male who presents to clinic today for the following health issues:    HPI       Diabetes Follow-up    How often are you checking your blood sugar? A few times a month  What time of day are you checking your blood sugars (select all that apply)?  Before meals  Have you had any blood sugars above 200?  Yes   Have you had any blood sugars below 70?  No    What symptoms do you notice when your blood sugar is low?  None    What concerns do you have today about your diabetes? None     Do you have any of these symptoms? (Select all that apply)  No numbness or tingling in feet.  No redness, sores or blisters on feet.  No complaints of excessive thirst.  No reports of blurry vision.  No significant changes to weight.    Have you had a diabetic eye exam in the last 12 months? No        BP Readings from Last 2 Encounters:   08/31/20 102/60   12/05/18 110/72     Hemoglobin A1C (%)   Date Value   05/14/2019 7.2 (H)   12/03/2018 9.0 (H)     LDL Cholesterol Calculated (mg/dL)   Date Value   12/03/2018     Cannot estimate LDL when triglyceride exceeds 400 mg/dL   11/10/2017 68     LDL Cholesterol Direct (mg/dL)   Date Value   12/03/2018 82         Hyperlipidemia Follow-Up      Are you regularly taking any medication or supplement to lower your cholesterol?   Yes- Lipitor     Are you having muscle aches or other side effects that you think could be caused by your cholesterol lowering medication?  No      How many servings of fruits and vegetables do you eat daily?  2-3    On average, how many sweetened beverages do you drink each day (Examples: soda, juice, sweet tea, etc.  Do NOT count diet or artificially sweetened beverages)?   1    How many days per week do you exercise enough to make your heart beat faster? 3 or less    How many minutes a day do you exercise enough to make your heart beat faster? 60 or more    How many days per week do you miss taking your medication? 1        Review  "of Systems         Objective    /60   Pulse 78   Temp 98.3  F (36.8  C)   Resp 16   Ht 1.664 m (5' 5.5\")   Wt 71.7 kg (158 lb)   SpO2 98%   BMI 25.89 kg/m    Body mass index is 25.89 kg/m .  Physical Exam               Further history obtained, clarified or corrected by physician:    He has no complaints today but he does anticipate that his A1c number may be elevated somewhat.  When asked how he would like to handle that he declines further work-up or diabetic nurse visits.  He says he will just need to adjust his lifestyle.    OBJECTIVE:  /60   Pulse 78   Temp 98.3  F (36.8  C)   Resp 16   Ht 1.664 m (5' 5.5\")   Wt 71.7 kg (158 lb)   SpO2 98%   BMI 25.89 kg/m    LUNGS: clear to auscultation, normal breath sounds  CV: RRR without murmur  ABD: BS+, soft, nontender, no masses, no hepatosplenomegaly  EXTREMITIES: without joint tenderness, swelling or erythema.  No muscle tenderness or abnormality.  SKIN: No rashes or abnormalities  NEURO:non focal exam    ASSESSMENT:     Hyperlipidemia LDL goal <100  Type 2 diabetes mellitus without complication, without long-term current use of insulin (H)    PLAN:  Orders Placed This Encounter     Hemoglobin A1c     Lipid panel reflex to direct LDL Fasting     Basic metabolic panel     atorvastatin (LIPITOR) 20 MG tablet     glipiZIDE (GLUCOTROL XL) 10 MG 24 hr tablet     metFORMIN (GLUCOPHAGE-XR) 500 MG 24 hr tablet         "

## 2020-08-31 NOTE — PATIENT INSTRUCTIONS
Our Clinic hours are:  Mondays    7:20 am - 7 pm  Tues -  Fri  7:20 am - 5 pm    Clinic Phone: [phone]    The clinic lab opens at 7:30 am Mon - Fri and appointments are required.    [facility]. [phone]  Monday  8 am - 7pm  Tues - Fri 8 am - 5:30 pm

## 2020-11-16 ENCOUNTER — HEALTH MAINTENANCE LETTER (OUTPATIENT)
Age: 45
End: 2020-11-16

## 2021-04-03 ENCOUNTER — HEALTH MAINTENANCE LETTER (OUTPATIENT)
Age: 46
End: 2021-04-03

## 2021-09-12 ENCOUNTER — HEALTH MAINTENANCE LETTER (OUTPATIENT)
Age: 46
End: 2021-09-12

## 2021-11-07 ENCOUNTER — HEALTH MAINTENANCE LETTER (OUTPATIENT)
Age: 46
End: 2021-11-07

## 2022-04-08 ENCOUNTER — OFFICE VISIT (OUTPATIENT)
Dept: FAMILY MEDICINE | Facility: CLINIC | Age: 47
End: 2022-04-08
Payer: COMMERCIAL

## 2022-04-08 VITALS
HEIGHT: 65 IN | TEMPERATURE: 97.2 F | SYSTOLIC BLOOD PRESSURE: 116 MMHG | OXYGEN SATURATION: 99 % | HEART RATE: 73 BPM | RESPIRATION RATE: 14 BRPM | DIASTOLIC BLOOD PRESSURE: 74 MMHG | WEIGHT: 150 LBS | BODY MASS INDEX: 24.99 KG/M2

## 2022-04-08 DIAGNOSIS — Z11.4 SCREENING FOR HIV (HUMAN IMMUNODEFICIENCY VIRUS): ICD-10-CM

## 2022-04-08 DIAGNOSIS — Z11.59 NEED FOR HEPATITIS C SCREENING TEST: ICD-10-CM

## 2022-04-08 DIAGNOSIS — E78.5 HYPERLIPIDEMIA LDL GOAL <100: ICD-10-CM

## 2022-04-08 DIAGNOSIS — E11.9 TYPE 2 DIABETES MELLITUS WITHOUT COMPLICATION, WITHOUT LONG-TERM CURRENT USE OF INSULIN (H): Primary | ICD-10-CM

## 2022-04-08 DIAGNOSIS — Z12.11 SCREEN FOR COLON CANCER: ICD-10-CM

## 2022-04-08 LAB
ALBUMIN SERPL-MCNC: 4.1 G/DL (ref 3.4–5)
ALP SERPL-CCNC: 91 U/L (ref 40–150)
ALT SERPL W P-5'-P-CCNC: 28 U/L (ref 0–70)
ANION GAP SERPL CALCULATED.3IONS-SCNC: 7 MMOL/L (ref 3–14)
AST SERPL W P-5'-P-CCNC: 11 U/L (ref 0–45)
BILIRUB SERPL-MCNC: 0.5 MG/DL (ref 0.2–1.3)
BUN SERPL-MCNC: 16 MG/DL (ref 7–30)
CALCIUM SERPL-MCNC: 9.4 MG/DL (ref 8.5–10.1)
CHLORIDE BLD-SCNC: 104 MMOL/L (ref 94–109)
CHOLEST SERPL-MCNC: 274 MG/DL
CO2 SERPL-SCNC: 28 MMOL/L (ref 20–32)
CREAT SERPL-MCNC: 0.6 MG/DL (ref 0.66–1.25)
CREAT UR-MCNC: 169 MG/DL
GFR SERPL CREATININE-BSD FRML MDRD: >90 ML/MIN/1.73M2
GLUCOSE BLD-MCNC: 251 MG/DL (ref 70–99)
HBA1C MFR BLD: 8.9 % (ref 0–5.6)
HDLC SERPL-MCNC: 38 MG/DL
HOLD SPECIMEN: NORMAL
LDLC SERPL CALC-MCNC: 140 MG/DL
LDLC SERPL CALC-MCNC: ABNORMAL MG/DL
MICROALBUMIN UR-MCNC: 17 MG/L
MICROALBUMIN/CREAT UR: 10.06 MG/G CR (ref 0–17)
NONHDLC SERPL-MCNC: 236 MG/DL
POTASSIUM BLD-SCNC: 4.2 MMOL/L (ref 3.4–5.3)
PROT SERPL-MCNC: 7.2 G/DL (ref 6.8–8.8)
SODIUM SERPL-SCNC: 139 MMOL/L (ref 133–144)
TRIGL SERPL-MCNC: 725 MG/DL

## 2022-04-08 PROCEDURE — 36415 COLL VENOUS BLD VENIPUNCTURE: CPT | Performed by: NURSE PRACTITIONER

## 2022-04-08 PROCEDURE — 99214 OFFICE O/P EST MOD 30 MIN: CPT | Performed by: NURSE PRACTITIONER

## 2022-04-08 PROCEDURE — 82043 UR ALBUMIN QUANTITATIVE: CPT | Performed by: NURSE PRACTITIONER

## 2022-04-08 PROCEDURE — 87389 HIV-1 AG W/HIV-1&-2 AB AG IA: CPT | Performed by: NURSE PRACTITIONER

## 2022-04-08 PROCEDURE — 80053 COMPREHEN METABOLIC PANEL: CPT | Performed by: NURSE PRACTITIONER

## 2022-04-08 PROCEDURE — 86803 HEPATITIS C AB TEST: CPT | Performed by: NURSE PRACTITIONER

## 2022-04-08 PROCEDURE — 83721 ASSAY OF BLOOD LIPOPROTEIN: CPT | Mod: 59 | Performed by: NURSE PRACTITIONER

## 2022-04-08 PROCEDURE — 80061 LIPID PANEL: CPT | Performed by: NURSE PRACTITIONER

## 2022-04-08 PROCEDURE — 83036 HEMOGLOBIN GLYCOSYLATED A1C: CPT | Performed by: NURSE PRACTITIONER

## 2022-04-08 RX ORDER — LANCETS
EACH MISCELLANEOUS
Qty: 102 EACH | Refills: 3 | Status: SHIPPED | OUTPATIENT
Start: 2022-04-08

## 2022-04-08 RX ORDER — GLUCOSAMINE HCL/CHONDROITIN SU 500-400 MG
CAPSULE ORAL
Qty: 100 EACH | Refills: 3 | Status: SHIPPED | OUTPATIENT
Start: 2022-04-08

## 2022-04-08 RX ORDER — METFORMIN HCL 500 MG
1000 TABLET, EXTENDED RELEASE 24 HR ORAL 2 TIMES DAILY WITH MEALS
Qty: 360 TABLET | Refills: 3 | Status: SHIPPED | OUTPATIENT
Start: 2022-04-08 | End: 2023-07-24

## 2022-04-08 RX ORDER — ATORVASTATIN CALCIUM 20 MG/1
20 TABLET, FILM COATED ORAL DAILY
Qty: 90 TABLET | Refills: 3 | Status: SHIPPED | OUTPATIENT
Start: 2022-04-08 | End: 2023-07-24

## 2022-04-08 RX ORDER — GLIPIZIDE 10 MG/1
10 TABLET, FILM COATED, EXTENDED RELEASE ORAL DAILY
Qty: 90 TABLET | Refills: 3 | Status: SHIPPED | OUTPATIENT
Start: 2022-04-08 | End: 2023-07-24

## 2022-04-08 RX ORDER — PEN NEEDLE, DIABETIC 32GX 5/32"
NEEDLE, DISPOSABLE MISCELLANEOUS
Qty: 12 EACH | Refills: 1 | Status: SHIPPED | OUTPATIENT
Start: 2022-04-08

## 2022-04-08 ASSESSMENT — PAIN SCALES - GENERAL: PAINLEVEL: NO PAIN (0)

## 2022-04-08 NOTE — PROGRESS NOTES
Assessment & Plan     Type 2 diabetes mellitus without complication, without long-term current use of insulin (H)  Renewed medications for diabetes. Will see if trulicity is covered. Hemoglobin a1c is 8.9%. encouraged him to start monitoring his diabetes, checking his blood glucose, and taking medications on a regular basis. Follow up in 3 months.   - Hemoglobin A1c; Future  - Hemoglobin A1c  - REVIEW OF HEALTH MAINTENANCE PROTOCOL ORDERS  - Albumin Random Urine Quantitative with Creat Ratio; Future  - OPTOMETRY REFERRAL; Future  - Lipid panel reflex to direct LDL Fasting; Future  - Comprehensive metabolic panel (BMP + Alb, Alk Phos, ALT, AST, Total. Bili, TP); Future  - metFORMIN (GLUCOPHAGE-XR) 500 MG 24 hr tablet; Take 2 tablets (1,000 mg) by mouth 2 times daily (with meals) TAKE 2 TABLETS BY MOUTH TWICE DAILY WITH MEALS  - dulaglutide (TRULICITY) 0.75 MG/0.5ML pen; Inject 0.75 mg Subcutaneous every 7 days  - glipiZIDE (GLUCOTROL XL) 10 MG 24 hr tablet; Take 1 tablet (10 mg) by mouth daily  - blood glucose monitoring (ACCU-CHEK FASTCLIX) lancets; Use to test blood sugar in the AM and after Dinner  - blood glucose (NO BRAND SPECIFIED) test strip; 1 strip by In Vitro route 2 times daily Use to test blood sugar 2 times daily AM and after dinner  - insulin pen needle (BD NALINI U/F) 32G X 4 MM miscellaneous; Use 1 daily as directed.  - blood glucose monitoring (NO BRAND SPECIFIED) meter device kit; Use to test blood sugar 2 times daily or as directed. Preferred blood glucose meter OR supplies to accompany: Blood Glucose Monitor Brands: per insurance.  - alcohol swab prep pads; Use to swab area of injection/leticia as directed.  - blood glucose calibration (NO BRAND SPECIFIED) solution; To accompany: Blood Glucose Monitor Brands: per insurance.  - Albumin Random Urine Quantitative with Creat Ratio    Hyperlipidemia LDL goal <100  Refilled lipitor today. Lipid panel completed.   - atorvastatin (LIPITOR) 20 MG tablet;  "Take 1 tablet (20 mg) by mouth daily    Screen for colon cancer  Order placed and discussed.  - COLOGUARD(EXACT SCIENCES)    Screening for HIV (human immunodeficiency virus)  - HIV Antigen Antibody Combo; Future    Need for hepatitis C screening test  - Hepatitis C Screen Reflex to HCV RNA Quant and Genotype; Future                 Return in 3 months (on 7/8/2022) for Routine preventive, Diabetes Follow up.    MELANIE Hameed CNP  [facility]    Shad Burgos is a 46 year old who presents for the following health issues:    History of Present Illness       Diabetes:   He presents for follow up of diabetes.  He is not checking blood glucose. He has no concerns regarding his diabetes at this time.  He is not experiencing numbness or burning in feet, excessive thirst, blurry vision, weight changes or redness, sores or blisters on feet. The patient has not had a diabetic eye exam in the last 12 months.         Hyperlipidemia:  He presents for follow up of hyperlipidemia.  He is taking medication to lower cholesterol. He is not having myalgia or other side effects to statin medications.    He eats 0-1 servings of fruits and vegetables daily.He consumes 1 sweetened beverage(s) daily.He exercises with enough effort to increase his heart rate 30 to 60 minutes per day.  He exercises with enough effort to increase his heart rate 4 days per week. He is missing 3 dose(s) of medications per week.     He has been off his diabetes medication for a while and did not take on a regular basis over the last several months. He has not checked his glucose for a long time.     Review of Systems   Constitutional, HEENT, cardiovascular, pulmonary, gi and gu systems are negative, except as otherwise noted.      Objective    /74   Pulse 73   Temp 97.2  F (36.2  C) (Tympanic)   Resp 14   Ht 1.657 m (5' 5.25\")   Wt 68 kg (150 lb)   SpO2 99%   BMI 24.77 kg/m    Body mass index is 24.77 " kg/m .  Physical Exam   GENERAL: healthy, alert and no distress  NECK: no adenopathy, no asymmetry, masses, or scars and thyroid normal to palpation  RESP: lungs clear to auscultation - no rales, rhonchi or wheezes  CV: regular rate and rhythm, normal S1 S2, no S3 or S4, no murmur, click or rub, no peripheral edema and peripheral pulses strong  ABDOMEN: soft, nontender, no hepatosplenomegaly, no masses and bowel sounds normal  MS: no gross musculoskeletal defects noted, no edema  Diabetic foot exam: normal DP and PT pulses, no trophic changes or ulcerative lesions and normal sensory exam        Results for orders placed or performed in visit on 04/08/22 (from the past 24 hour(s))   Hemoglobin A1c   Result Value Ref Range    Hemoglobin A1C 8.9 (H) 0.0 - 5.6 %   Extra Tube    Narrative    The following orders were created for panel order Extra Tube.  Procedure                               Abnormality         Status                     ---------                               -----------         ------                     Extra Red Top Tube[[id number]]                               In process                 Extra Green Top (Lithium...[[id number]]                      In process                 Extra Purple Top Tube[[id number]]                            In process                   Please view results for these tests on the individual orders.

## 2022-04-11 ENCOUNTER — TRANSFERRED RECORDS (OUTPATIENT)
Dept: HEALTH INFORMATION MANAGEMENT | Facility: CLINIC | Age: 47
End: 2022-04-11
Payer: COMMERCIAL

## 2022-04-11 LAB
HCV AB SERPL QL IA: NONREACTIVE
HIV 1+2 AB+HIV1 P24 AG SERPL QL IA: NONREACTIVE
RETINOPATHY: NEGATIVE

## 2022-04-24 ENCOUNTER — HEALTH MAINTENANCE LETTER (OUTPATIENT)
Age: 47
End: 2022-04-24

## 2022-10-25 ENCOUNTER — TELEPHONE (OUTPATIENT)
Dept: FAMILY MEDICINE | Facility: CLINIC | Age: 47
End: 2022-10-25

## 2022-10-25 NOTE — TELEPHONE ENCOUNTER
.Patient Quality Outreach    Patient is due for the following:   Diabetes -  A1C  Colon Cancer Screening  Physical Preventive Adult Physical   Immunizations    Next Steps:   Schedule a Adult Preventative    Type of outreach:    Sent [employer] message.    Next Steps:  Reach out within 90 days via [employer].    Max number of attempts reached: No. Will try again in 90 days if patient still on fail list.    Questions for provider review:    None     Katie Ramachandran MA

## 2022-11-19 ENCOUNTER — HEALTH MAINTENANCE LETTER (OUTPATIENT)
Age: 47
End: 2022-11-19

## 2023-06-01 ENCOUNTER — HEALTH MAINTENANCE LETTER (OUTPATIENT)
Age: 48
End: 2023-06-01

## 2023-07-07 ENCOUNTER — HOSPITAL ENCOUNTER (EMERGENCY)
Facility: CLINIC | Age: 48
Discharge: HOME | End: 2023-07-07
Attending: PHYSICIAN ASSISTANT | Admitting: PHYSICIAN ASSISTANT
Payer: COMMERCIAL

## 2023-07-07 VITALS
OXYGEN SATURATION: 100 % | DIASTOLIC BLOOD PRESSURE: 84 MMHG | HEART RATE: 94 BPM | RESPIRATION RATE: 16 BRPM | TEMPERATURE: 99.6 F | SYSTOLIC BLOOD PRESSURE: 111 MMHG

## 2023-07-07 DIAGNOSIS — R53.83 FATIGUE: ICD-10-CM

## 2023-07-07 LAB
ALBUMIN SERPL BCG-MCNC: 4.3 G/DL (ref 3.5–5.2)
ALP SERPL-CCNC: 102 U/L (ref 40–129)
ALT SERPL W P-5'-P-CCNC: 47 U/L (ref 0–70)
ANION GAP SERPL CALCULATED.3IONS-SCNC: 14 MMOL/L (ref 7–15)
AST SERPL W P-5'-P-CCNC: 39 U/L (ref 0–45)
BASOPHILS # BLD AUTO: 0 10E3/UL (ref 0–0.2)
BASOPHILS NFR BLD AUTO: 1 %
BILIRUB SERPL-MCNC: 0.7 MG/DL
BUN SERPL-MCNC: 11.9 MG/DL (ref 6–20)
CALCIUM SERPL-MCNC: 9.6 MG/DL (ref 8.6–10)
CHLORIDE SERPL-SCNC: 97 MMOL/L (ref 98–107)
CREAT SERPL-MCNC: 0.63 MG/DL (ref 0.67–1.17)
DEPRECATED HCO3 PLAS-SCNC: 23 MMOL/L (ref 22–29)
EOSINOPHIL # BLD AUTO: 0 10E3/UL (ref 0–0.7)
EOSINOPHIL NFR BLD AUTO: 1 %
ERYTHROCYTE [DISTWIDTH] IN BLOOD BY AUTOMATED COUNT: 12.3 % (ref 10–15)
FLUAV RNA SPEC QL NAA+PROBE: NEGATIVE
FLUBV RNA RESP QL NAA+PROBE: NEGATIVE
GFR SERPL CREATININE-BSD FRML MDRD: >90 ML/MIN/1.73M2
GLUCOSE SERPL-MCNC: 276 MG/DL (ref 70–99)
HCT VFR BLD AUTO: 39.5 % (ref 40–53)
HGB BLD-MCNC: 14.4 G/DL (ref 13.3–17.7)
IMM GRANULOCYTES # BLD: 0 10E3/UL
IMM GRANULOCYTES NFR BLD: 1 %
LYMPHOCYTES # BLD AUTO: 1.1 10E3/UL (ref 0.8–5.3)
LYMPHOCYTES NFR BLD AUTO: 24 %
MCH RBC QN AUTO: 31 PG (ref 26.5–33)
MCHC RBC AUTO-ENTMCNC: 36.5 G/DL (ref 31.5–36.5)
MCV RBC AUTO: 85 FL (ref 78–100)
MONOCYTES # BLD AUTO: 0.6 10E3/UL (ref 0–1.3)
MONOCYTES NFR BLD AUTO: 13 %
NEUTROPHILS # BLD AUTO: 2.8 10E3/UL (ref 1.6–8.3)
NEUTROPHILS NFR BLD AUTO: 60 %
NRBC # BLD AUTO: 0 10E3/UL
NRBC BLD AUTO-RTO: 0 /100
PLATELET # BLD AUTO: 170 10E3/UL (ref 150–450)
POTASSIUM SERPL-SCNC: 4.1 MMOL/L (ref 3.4–5.3)
PROT SERPL-MCNC: 7 G/DL (ref 6.4–8.3)
RBC # BLD AUTO: 4.65 10E6/UL (ref 4.4–5.9)
RSV RNA SPEC NAA+PROBE: NEGATIVE
SARS-COV-2 RNA RESP QL NAA+PROBE: NEGATIVE
SODIUM SERPL-SCNC: 134 MMOL/L (ref 136–145)
WBC # BLD AUTO: 4.6 10E3/UL (ref 4–11)

## 2023-07-07 PROCEDURE — 80053 COMPREHEN METABOLIC PANEL: CPT | Performed by: PHYSICIAN ASSISTANT

## 2023-07-07 PROCEDURE — 87207 SMEAR SPECIAL STAIN: CPT | Performed by: PHYSICIAN ASSISTANT

## 2023-07-07 PROCEDURE — 86618 LYME DISEASE ANTIBODY: CPT | Performed by: PHYSICIAN ASSISTANT

## 2023-07-07 PROCEDURE — 99214 OFFICE O/P EST MOD 30 MIN: CPT | Performed by: PHYSICIAN ASSISTANT

## 2023-07-07 PROCEDURE — 87798 DETECT AGENT NOS DNA AMP: CPT | Performed by: PHYSICIAN ASSISTANT

## 2023-07-07 PROCEDURE — 87015 SPECIMEN INFECT AGNT CONCNTJ: CPT | Performed by: PHYSICIAN ASSISTANT

## 2023-07-07 PROCEDURE — 36415 COLL VENOUS BLD VENIPUNCTURE: CPT | Performed by: PHYSICIAN ASSISTANT

## 2023-07-07 PROCEDURE — G0463 HOSPITAL OUTPT CLINIC VISIT: HCPCS | Performed by: PHYSICIAN ASSISTANT

## 2023-07-07 PROCEDURE — 87637 SARSCOV2&INF A&B&RSV AMP PRB: CPT | Performed by: PHYSICIAN ASSISTANT

## 2023-07-07 PROCEDURE — 85025 COMPLETE CBC W/AUTO DIFF WBC: CPT | Performed by: PHYSICIAN ASSISTANT

## 2023-07-07 PROCEDURE — 87484 EHRLICHA CHAFFEENSIS AMP PRB: CPT | Performed by: PHYSICIAN ASSISTANT

## 2023-07-07 ASSESSMENT — ACTIVITIES OF DAILY LIVING (ADL): ADLS_ACUITY_SCORE: 35

## 2023-07-07 NOTE — ED PROVIDER NOTES
History     Chief Complaint   Patient presents with     Fatigue     HPI  Aubrey Ochoa is a 47 year old male past medical history significant for type 2 diabetes mellitus, hyperlipidemia presents to urgent care with concern over fatigue which is been present for the last 5 days.  Patient additionally complains of chills, sweats, myalgias.  He has not had any objective fever measured.  No sore throat, nasal congestion, cough, dyspnea, wheezing, nausea, vomiting, diarrhea or abdominal complaints.  He initially thought symptoms could be secondary to hot temperatures possible dehydration while being outside during Fourth of July weekend and has attempted to treat with increased fluids. He notes that since increasing fluids he has had increased urinary output.  He denies any dysuria, hematuria.  He has not attempted any OTC treatments consistently He does not check blood sugar levels at home, has not seen PCP within the last year.    Allergies:  No Known Allergies    Problem List:    Patient Active Problem List    Diagnosis Date Noted     Type 2 diabetes mellitus without complications (H) 10/01/2013     Priority: Medium     Hyperlipidemia LDL goal <100 12/21/2011     Priority: Medium     Nose deformities, acquired 12/21/2011     Priority: Medium     Nonspecific abnormal results of liver function study 11/08/2005     Priority: Medium     slightly elevated ALT on Lipitor           Past Medical History:    Past Medical History:   Diagnosis Date     Pure hypercholesterolemia        Past Surgical History:    Past Surgical History:   Procedure Laterality Date     ANGIOGRAM  3/2/2007    negative cath     ORTHOPEDIC SURGERY  2/2007    right ankle     SURGICAL HISTORY OF -   9/1974    Circumcision       Family History:    Family History   Problem Relation Age of Onset     Lipids Mother      Diabetes Father      Cerebrovascular Disease Father      Heart Disease Father      Myocardial Infarction Father      Psychotic Disorder  Sister         depression     Respiratory Daughter         cleft a rare      Neurologic Disorder Maternal Grandmother          age 50 brain tumor       Social History:  Marital Status:   [2]  Social History     Tobacco Use     Smoking status: Former     Types: Cigarettes, Cigars     Smokeless tobacco: Current     Types: Chew     Last attempt to quit: 2020     Tobacco comments:     occ cigar  weekly,    Vaping Use     Vaping Use: Never used   Substance Use Topics     Alcohol use: Yes     Comment: not daily     Drug use: No      Medications:    atorvastatin (LIPITOR) 20 MG tablet  glipiZIDE (GLUCOTROL XL) 10 MG 24 hr tablet  metFORMIN (GLUCOPHAGE-XR) 500 MG 24 hr tablet  alcohol swab prep pads  blood glucose (NO BRAND SPECIFIED) test strip  blood glucose calibration (NO BRAND SPECIFIED) solution  blood glucose monitoring (ACCU-CHEK FASTCLIX) lancets  blood glucose monitoring (NO BRAND SPECIFIED) meter device kit  blood glucose monitoring (NO BRAND SPECIFIED) test strip  blood glucose monitoring (ONE TOUCH DELICA) lancets  blood glucose monitoring (ONE TOUCH VERIO IQ) test strip  dulaglutide (TRULICITY) 0.75 MG/0.5ML pen  insulin pen needle (BD NALINI U/F) 32G X 4 MM miscellaneous  ORDER FOR DME      Review of Systems   Constitutional: Positive for activity change, appetite change, chills and fatigue. Negative for fever.   HENT: Negative for congestion, ear pain, sinus pain and sore throat.    Eyes: Negative for photophobia, pain, discharge, redness, itching and visual disturbance.   Respiratory: Negative for cough, shortness of breath and wheezing.    Cardiovascular: Negative for chest pain and palpitations.   Gastrointestinal: Negative for abdominal pain, diarrhea, nausea and vomiting.   Genitourinary: Negative for dysuria and hematuria.   Skin: Negative for color change, rash and wound.     Physical Exam   BP: 111/84  Pulse: 94  Temp: 99.6  F (37.6  C)  Resp: 16  SpO2: 100 %  Physical Exam    GENERAL  APPEARANCE: healthy, alert and no distress  EYES: EOMI,  PERRL, conjunctiva clear  HENT: ear canals and TM's normal.  Nose and mouth without ulcers, erythema or lesions  NECK: supple, nontender, no lymphadenopathy  RESP: lungs clear to auscultation - no rales, rhonchi or wheezes  CV: regular rates and rhythm, normal S1 S2, no murmur noted  ABDOMEN:  soft, nontender, no HSM or masses and bowel sounds normal  NEURO: Normal strength and tone, sensory exam grossly normal,  normal speech and mentation  SKIN: no suspicious lesions or rashes    ED Course                 Procedures              Critical Care time:  none               Results for orders placed or performed during the hospital encounter of 07/07/23 (from the past 24 hour(s))   Symptomatic Influenza A/B, RSV, & SARS-CoV2 PCR (COVID-19) Nasopharyngeal    Specimen: Nasopharyngeal; Swab   Result Value Ref Range    Influenza A PCR Negative Negative    Influenza B PCR Negative Negative    RSV PCR Negative Negative    SARS CoV2 PCR Negative Negative    Narrative    Testing was performed using the Xpert Xpress CoV2/Flu/RSV Assay on the Cepheid GeneXpert Instrument. This test should be ordered for the detection of SARS-CoV-2, influenza, and RSV viruses in individuals who meet clinical and/or epidemiological criteria. Test performance is unknown in asymptomatic patients. This test is for in vitro diagnostic use under the FDA EUA for laboratories certified under CLIA to perform high or moderate complexity testing. This test has not been FDA cleared or approved. A negative result does not rule out the presence of PCR inhibitors in the specimen or target RNA in concentration below the limit of detection for the assay. If only one viral target is positive but coinfection with multiple targets is suspected, the sample should be re-tested with another FDA cleared, approved, or authorized test, if coinfection would change clinical management. This test was validated by the [facility]. These laboratories are certified under the Clinical Laboratory Improvement Amendments of 1988 (CLIA-88) as qualified to perform high complexity laboratory testing.   CBC with platelets, differential    Narrative    The following orders were created for panel order CBC with platelets, differential.  Procedure                               Abnormality         Status                     ---------                               -----------         ------                     CBC with platelets and d...[[id number]]  Abnormal            Final result                 Please view results for these tests on the individual orders.   Comprehensive metabolic panel   Result Value Ref Range    Sodium 134 (L) 136 - 145 mmol/L    Potassium 4.1 3.4 - 5.3 mmol/L    Chloride 97 (L) 98 - 107 mmol/L    Carbon Dioxide (CO2) 23 22 - 29 mmol/L    Anion Gap 14 7 - 15 mmol/L    Urea Nitrogen 11.9 6.0 - 20.0 mg/dL    Creatinine 0.63 (L) 0.67 - 1.17 mg/dL    Calcium 9.6 8.6 - 10.0 mg/dL    Glucose 276 (H) 70 - 99 mg/dL    Alkaline Phosphatase 102 40 - 129 U/L    AST 39 0 - 45 U/L    ALT 47 0 - 70 U/L    Protein Total 7.0 6.4 - 8.3 g/dL    Albumin 4.3 3.5 - 5.2 g/dL    Bilirubin Total 0.7 <=1.2 mg/dL    GFR Estimate >90 >60 mL/min/1.73m2   CBC with platelets and differential   Result Value Ref Range    WBC Count 4.6 4.0 - 11.0 10e3/uL    RBC Count 4.65 4.40 - 5.90 10e6/uL    Hemoglobin 14.4 13.3 - 17.7 g/dL    Hematocrit 39.5 (L) 40.0 - 53.0 %    MCV 85 78 - 100 fL    MCH 31.0 26.5 - 33.0 pg    MCHC 36.5 31.5 - 36.5 g/dL    RDW 12.3 10.0 - 15.0 %    Platelet Count 170 150 - 450 10e3/uL    % Neutrophils 60 %    % Lymphocytes 24 %    % Monocytes 13 %    % Eosinophils 1 %    % Basophils 1 %    % Immature Granulocytes 1 %    NRBCs per 100 WBC 0 <1 /100    Absolute Neutrophils 2.8 1.6 - 8.3 10e3/uL    Absolute Lymphocytes 1.1 0.8 - 5.3 10e3/uL    Absolute Monocytes 0.6 0.0 - 1.3 10e3/uL    Absolute Eosinophils 0.0 0.0 - 0.7  10e3/uL    Absolute Basophils 0.0 0.0 - 0.2 10e3/uL    Absolute Immature Granulocytes 0.0 <=0.4 10e3/uL    Absolute NRBCs 0.0 10e3/uL       Medications - No data to display    Assessments & Plan (with Medical Decision Making)     I have reviewed the nursing notes.    I have reviewed the findings, diagnosis, plan and need for follow up with the patient.       Discharge Medication List as of 7/7/2023  6:10 PM          Final diagnoses:   Fatigue     47-year-old male presents to urgent care for 5-day history of fatigue with chills, sweats, myalgias.  He had stable vital signs upon arrival.  Physical exam findings were benign.  As part of evaluation he did have lab testing including noncontributory CBC, CMP showed mild hyponatremia with sodium of 134, glucose elevated at 276, discussed elevated glucose could contribute to fatigue.  influenza, COVID-19, RSV testing was negative.  Testing for tickborne infections was pending at time of discharge.  Patient discharged home stable with instructions for continued monitoring.  Follow-up with primary care provider for recheck within the next week.  Worrisome reasons to return to the ER/UC sooner discussed.    Disclaimer: This note consists of symbols derived from keyboarding, dictation, and/or voice recognition software. As a result, there may be errors in the script that have gone undetected.  Please consider this when interpreting information found in the chart.      7/7/2023   [facility] EMERGENCY DEPT     Malinda Ray PA-C  07/09/23 0655

## 2023-07-07 NOTE — ED TRIAGE NOTES
Pt reports cold chills/hot sweats, body aches, fatigue. Pt states he does not check his blood sugars at home.   Symptom onset 7/3/23

## 2023-07-09 LAB
ANAPLASMA BLD MOD GIEMSA: NEGATIVE
B MICROTI BLD SMEAR: NEGATIVE
EHRLICHIA SPEC QL MICRO: NEGATIVE

## 2023-07-09 ASSESSMENT — ENCOUNTER SYMPTOMS
ACTIVITY CHANGE: 1
NAUSEA: 0
PHOTOPHOBIA: 0
SINUS PAIN: 0
SHORTNESS OF BREATH: 0
EYE DISCHARGE: 0
EYE ITCHING: 0
PALPITATIONS: 0
COLOR CHANGE: 0
FEVER: 0
ABDOMINAL PAIN: 0
COUGH: 0
FATIGUE: 1
DYSURIA: 0
WHEEZING: 0
VOMITING: 0
APPETITE CHANGE: 1
EYE REDNESS: 0
DIARRHEA: 0
WOUND: 0
EYE PAIN: 0
HEMATURIA: 0
SORE THROAT: 0
CHILLS: 1

## 2023-07-10 LAB — B BURGDOR IGG+IGM SER QL: 0.05

## 2023-07-11 LAB
A PHAGOCYTOPH DNA BLD QL NAA+PROBE: NOT DETECTED
B MICROTI DNA BLD QL NAA+PROBE: NOT DETECTED
BABESIA DNA BLD QL NAA+PROBE: NOT DETECTED
E CHAFFEENSIS DNA BLD QL NAA+PROBE: NOT DETECTED
E EWINGII DNA SPEC QL NAA+PROBE: NOT DETECTED
EHRLICHIA DNA SPEC QL NAA+PROBE: NOT DETECTED

## 2023-07-22 DIAGNOSIS — E78.5 HYPERLIPIDEMIA LDL GOAL <100: ICD-10-CM

## 2023-07-22 DIAGNOSIS — E11.9 TYPE 2 DIABETES MELLITUS WITHOUT COMPLICATION, WITHOUT LONG-TERM CURRENT USE OF INSULIN (H): ICD-10-CM

## 2023-07-24 RX ORDER — GLIPIZIDE 10 MG/1
TABLET, FILM COATED, EXTENDED RELEASE ORAL
Qty: 90 TABLET | Refills: 0 | Status: SHIPPED | OUTPATIENT
Start: 2023-07-24 | End: 2023-08-11

## 2023-07-24 RX ORDER — METFORMIN HCL 500 MG
TABLET, EXTENDED RELEASE 24 HR ORAL
Qty: 360 TABLET | Refills: 0 | Status: SHIPPED | OUTPATIENT
Start: 2023-07-24 | End: 2023-08-11

## 2023-07-24 RX ORDER — ATORVASTATIN CALCIUM 20 MG/1
TABLET, FILM COATED ORAL
Qty: 90 TABLET | Refills: 0 | Status: SHIPPED | OUTPATIENT
Start: 2023-07-24 | End: 2023-08-11

## 2023-07-24 NOTE — TELEPHONE ENCOUNTER
"Has appointment scheduled for 8/11/23      Requested Prescriptions   Pending Prescriptions Disp Refills    metFORMIN (GLUCOPHAGE XR) 500 MG 24 hr tablet [Pharmacy Med Name: metFORMIN HCl  MG Oral Tablet Extended Release 24 Hour] 360 tablet 0     Sig: TAKE 2 TABLETS BY MOUTH TWICE DAILY WITH MEALS       Biguanide Agents Failed - 7/22/2023  9:53 AM        Failed - Patient has documented A1c within the specified period of time.     If HgbA1C is 8 or greater, it needs to be on file within the past 3 months.  If less than 8, must be on file within the past 6 months.     Recent Labs   Lab Test 04/08/22  0838   A1C 8.9*             Passed - Patient is age 10 or older        Passed - Patient's CR is NOT>1.4 OR Patient's EGFR is NOT<45 within past 12 mos.     Recent Labs   Lab Test 07/07/23  1731 04/08/22  0838 08/31/20  1057   GFRESTIMATED >90   < > >90   GFRESTBLACK  --   --  >90    < > = values in this interval not displayed.       Recent Labs   Lab Test 07/07/23  1731   CR 0.63*             Passed - Patient does NOT have a diagnosis of CHF.        Passed - Medication is active on med list        Passed - Recent (6 mo) or future (30 days) visit within the authorizing provider's specialty     Patient had office visit in the last 6 months or has a visit in the next 30 days with authorizing provider or within the authorizing provider's specialty.  See \"Patient Info\" tab in inbasket, or \"Choose Columns\" in Meds & Orders section of the refill encounter.              atorvastatin (LIPITOR) 20 MG tablet [Pharmacy Med Name: Atorvastatin Calcium 20 MG Oral Tablet] 90 tablet 0     Sig: Take 1 tablet by mouth once daily       Statins Protocol Failed - 7/22/2023  9:53 AM        Failed - LDL on file in past 12 months     Recent Labs   Lab Test 04/08/22  0838   *             Failed - Recent (12 mo) or future (30 days) visit within the authorizing provider's specialty     Patient has had an office visit with the authorizing " "provider or a provider within the authorizing providers department within the previous 12 mos or has a future within next 30 days. See \"Patient Info\" tab in inbasket, or \"Choose Columns\" in Meds & Orders section of the refill encounter.              Passed - No abnormal creatine kinase in past 12 months     No lab results found.             Passed - Medication is active on med list        Passed - Patient is age 18 or older          glipiZIDE (GLUCOTROL XL) 10 MG 24 hr tablet [Pharmacy Med Name: glipiZIDE ER 10 MG Oral Tablet Extended Release 24 Hour] 90 tablet 0     Sig: Take 1 tablet by mouth once daily       Sulfonylurea Agents Failed - 7/22/2023  9:53 AM        Failed - Patient has documented A1c within the specified period of time.     If HgbA1C is 8 or greater, it needs to be on file within the past 3 months.  If less than 8, must be on file within the past 6 months.     Recent Labs   Lab Test 04/08/22  0838   A1C 8.9*             Failed - Patient has a recent creatinine (normal) within the past 12 mos.     Recent Labs   Lab Test 07/07/23  1731   CR 0.63*       Ok to refill medication if creatinine is low          Passed - Medication is active on med list        Passed - Patient is age 18 or older        Passed - Recent (6 mo) or future (30 days) visit within the authorizing provider's specialty     Patient had office visit in the last 6 months or has a visit in the next 30 days with authorizing provider or within the authorizing provider's specialty.  See \"Patient Info\" tab in inbasket, or \"Choose Columns\" in Meds & Orders section of the refill encounter.                 "

## 2023-08-11 ENCOUNTER — LAB (OUTPATIENT)
Dept: LAB | Facility: CLINIC | Age: 48
End: 2023-08-11
Payer: COMMERCIAL

## 2023-08-11 ENCOUNTER — OFFICE VISIT (OUTPATIENT)
Dept: FAMILY MEDICINE | Facility: CLINIC | Age: 48
End: 2023-08-11
Payer: COMMERCIAL

## 2023-08-11 VITALS
DIASTOLIC BLOOD PRESSURE: 72 MMHG | RESPIRATION RATE: 16 BRPM | HEART RATE: 71 BPM | OXYGEN SATURATION: 98 % | TEMPERATURE: 96.9 F | HEIGHT: 65 IN | BODY MASS INDEX: 24.66 KG/M2 | WEIGHT: 148 LBS | SYSTOLIC BLOOD PRESSURE: 110 MMHG

## 2023-08-11 DIAGNOSIS — E11.9 TYPE 2 DIABETES MELLITUS WITHOUT COMPLICATION, WITHOUT LONG-TERM CURRENT USE OF INSULIN (H): ICD-10-CM

## 2023-08-11 DIAGNOSIS — Z12.11 SCREEN FOR COLON CANCER: ICD-10-CM

## 2023-08-11 DIAGNOSIS — E78.5 HYPERLIPIDEMIA LDL GOAL <100: ICD-10-CM

## 2023-08-11 DIAGNOSIS — Z00.00 ROUTINE GENERAL MEDICAL EXAMINATION AT A HEALTH CARE FACILITY: Primary | ICD-10-CM

## 2023-08-11 LAB
CHOLEST SERPL-MCNC: 211 MG/DL
CREAT UR-MCNC: 254.5 MG/DL
HBA1C MFR BLD: 8.3 % (ref 0–5.6)
HDLC SERPL-MCNC: 38 MG/DL
LDLC SERPL CALC-MCNC: ABNORMAL MG/DL
LDLC SERPL DIRECT ASSAY-MCNC: 111 MG/DL
MICROALBUMIN UR-MCNC: 13.8 MG/L
MICROALBUMIN/CREAT UR: 5.42 MG/G CR (ref 0–17)
NONHDLC SERPL-MCNC: 173 MG/DL
TRIGL SERPL-MCNC: 482 MG/DL

## 2023-08-11 PROCEDURE — 36415 COLL VENOUS BLD VENIPUNCTURE: CPT

## 2023-08-11 PROCEDURE — 90471 IMMUNIZATION ADMIN: CPT | Performed by: NURSE PRACTITIONER

## 2023-08-11 PROCEDURE — 99214 OFFICE O/P EST MOD 30 MIN: CPT | Mod: 25 | Performed by: NURSE PRACTITIONER

## 2023-08-11 PROCEDURE — 83036 HEMOGLOBIN GLYCOSYLATED A1C: CPT

## 2023-08-11 PROCEDURE — 90677 PCV20 VACCINE IM: CPT | Performed by: NURSE PRACTITIONER

## 2023-08-11 PROCEDURE — 99396 PREV VISIT EST AGE 40-64: CPT | Mod: 25 | Performed by: NURSE PRACTITIONER

## 2023-08-11 PROCEDURE — 82570 ASSAY OF URINE CREATININE: CPT

## 2023-08-11 PROCEDURE — 82043 UR ALBUMIN QUANTITATIVE: CPT

## 2023-08-11 PROCEDURE — 80061 LIPID PANEL: CPT

## 2023-08-11 PROCEDURE — 83721 ASSAY OF BLOOD LIPOPROTEIN: CPT | Mod: 59

## 2023-08-11 RX ORDER — GLIPIZIDE 10 MG/1
10 TABLET, FILM COATED, EXTENDED RELEASE ORAL DAILY
Qty: 90 TABLET | Refills: 0 | Status: SHIPPED | OUTPATIENT
Start: 2023-08-11 | End: 2024-06-25

## 2023-08-11 RX ORDER — METFORMIN HCL 500 MG
1000 TABLET, EXTENDED RELEASE 24 HR ORAL 2 TIMES DAILY WITH MEALS
Qty: 360 TABLET | Refills: 3 | Status: SHIPPED | OUTPATIENT
Start: 2023-08-11

## 2023-08-11 RX ORDER — ATORVASTATIN CALCIUM 20 MG/1
20 TABLET, FILM COATED ORAL DAILY
Qty: 90 TABLET | Refills: 3 | Status: SHIPPED | OUTPATIENT
Start: 2023-08-11

## 2023-08-11 ASSESSMENT — ENCOUNTER SYMPTOMS
HEMATURIA: 0
SHORTNESS OF BREATH: 0
HEARTBURN: 0
NERVOUS/ANXIOUS: 0
PALPITATIONS: 0
DYSURIA: 0
PARESTHESIAS: 0
MYALGIAS: 0
HEADACHES: 0
JOINT SWELLING: 0
DIZZINESS: 0
FEVER: 0
COUGH: 0
ARTHRALGIAS: 0
CONSTIPATION: 0
DIARRHEA: 0
FREQUENCY: 0
CHILLS: 0
SORE THROAT: 0
HEMATOCHEZIA: 0
EYE PAIN: 0
ABDOMINAL PAIN: 0
WEAKNESS: 0
NAUSEA: 0

## 2023-08-11 ASSESSMENT — PAIN SCALES - GENERAL: PAINLEVEL: NO PAIN (0)

## 2023-08-11 NOTE — NURSING NOTE
Screening Questionnaire for Adult Immunization   Are you sick today? No  Do you have allergies to medications, food, a vaccine component or latex? No  Have you ever had a serious reaction after receiving a vaccination? No  Do you have a long-term health problem with heart, lung, kidney, metabolic disease (e.g. diabetes)disease, asthma,a blood disorder, no spleen, complement component deficiency, a cochlear implant, or a spinal fluid leak?  Are you on long-term aspirin therapy? No  Do you, or does a close family member, have cancer, leukemia, HIV/AIDS, or any other immune system problem? No  In the past 3 months, have you taken medications that affect your immune system, such as cortisone, prednisone, other steroids, or anticancer drugs; drugs for the teatment of rheumatoid arthritis, Crohn's disease, pr psoriasis;  or have you had radiation treatments? No  Have you had a seizure, or a brain or other nervous system problem? No  During the past year, have you received a transfusion of blood or blood products, or been given immune (gamma) globulin or antiviral drug? No  For women: Are you pregnant or is there a chance you could become pregnant during the next month? No  Have you received any vaccinations in the past 4 weeks? No    Immunization questionnaire answers were all negative.        Per orders of Dr. Roz Hagan, injection of PCV20 given by Katie Ramachandran MA. Patient instructed to remain in clinic for 20 minutes afterwards, and to report any adverse reaction to me immediately.       Screening performed by Katie Ramachandran MA on 8/11/2023 at 8:43 AM.

## 2023-08-11 NOTE — PROGRESS NOTES
SUBJECTIVE:   CC: Aubrey is an 47 year old who presents for preventative health visit.       8/11/2023     8:16 AM   Additional Questions   Roomed by Katie ANNE CMA   Accompanied by self       Healthy Habits:     Getting at least 3 servings of Calcium per day:  NO    Bi-annual eye exam:  Yes    Dental care twice a year:  NO    Sleep apnea or symptoms of sleep apnea:  Daytime drowsiness    Diet:  Regular (no restrictions)    Frequency of exercise:  None    Taking medications regularly:  No    Barriers to taking medications:  Problems remembering to take them    Medication side effects:  None    Additional concerns today:  No      Today's PHQ-2 Score:       8/11/2023     8:19 AM   PHQ-2 ( 1999 [employer])   Q1: Little interest or pleasure in doing things 0   Q2: Feeling down, depressed or hopeless 0   PHQ-2 Score 0   Q1: Little interest or pleasure in doing things Not at all   Q2: Feeling down, depressed or hopeless Not at all   PHQ-2 Score 0         Diabetes Follow-up    How often are you checking your blood sugar? Not at all  What concerns do you have today about your diabetes? None   Do you have any of these symptoms? (Select all that apply)  No numbness or tingling in feet.  No redness, sores or blisters on feet.  No complaints of excessive thirst.  No reports of blurry vision.  No significant changes to weight.  Have you had a diabetic eye exam in the last 12 months? No            Hyperlipidemia Follow-Up    Are you regularly taking any medication or supplement to lower your cholesterol?   Yes- atorvastatin  Are you having muscle aches or other side effects that you think could be caused by your cholesterol lowering medication?  No        BP Readings from Last 2 Encounters:   08/11/23 110/72   07/07/23 111/84     Hemoglobin A1C (%)   Date Value   04/08/2022 8.9 (H)   08/31/2020 9.4 (H)   05/14/2019 7.2 (H)     LDL Cholesterol Calculated   Date Value   04/08/2022      Comment:     Cannot estimate LDL when triglyceride  exceeds 400 mg/dL   08/31/2020     Cannot estimate LDL when triglyceride exceeds 400 mg/dL mg/dL   12/03/2018     Cannot estimate LDL when triglyceride exceeds 400 mg/dL mg/dL     LDL Cholesterol Direct (mg/dL)   Date Value   04/08/2022 140 (H)   08/31/2020 73   12/03/2018 82         Hyperlipidemia Follow-Up    Are you regularly taking any medication or supplement to lower your cholesterol?   No, forgetting about 2-3 x per week.   Are you having muscle aches or other side effects that you think could be caused by your cholesterol lowering medication?  No  Have you ever done Advance Care Planning? (For example, a Health Directive, POLST, or a discussion with a medical provider or your loved ones about your wishes): No, advance care planning information given to patient to review.  Patient plans to discuss their wishes with loved ones or provider.      Social History     Tobacco Use    Smoking status: Former     Types: Cigarettes, Cigars    Smokeless tobacco: Current     Types: Chew     Last attempt to quit: 2/29/2020    Tobacco comments:     occ cigar  weekly,    Substance Use Topics    Alcohol use: Yes     Comment: not daily             8/11/2023     8:17 AM   Alcohol Use   Prescreen: >3 drinks/day or >7 drinks/week? No       Last PSA: No results found for: PSA    Reviewed orders with patient. Reviewed health maintenance and updated orders accordingly - Yes  Lab work is in process  Labs reviewed in EPIC  BP Readings from Last 3 Encounters:   08/11/23 110/72   07/07/23 111/84   04/08/22 116/74    Wt Readings from Last 3 Encounters:   08/11/23 67.1 kg (148 lb)   04/08/22 68 kg (150 lb)   08/31/20 71.7 kg (158 lb)                  Patient Active Problem List   Diagnosis    Nonspecific abnormal results of liver function study    Hyperlipidemia LDL goal <100    Nose deformities, acquired    Type 2 diabetes mellitus without complications (H)     Past Surgical History:   Procedure Laterality Date    ANGIOGRAM  3/2/2007     negative cath    ORTHOPEDIC SURGERY  2007    right ankle    SURGICAL HISTORY OF -   1974    Circumcision       Social History     Tobacco Use    Smoking status: Former     Types: Cigarettes, Cigars    Smokeless tobacco: Current     Types: Chew     Last attempt to quit: 2020    Tobacco comments:     occ cigar  weekly,    Substance Use Topics    Alcohol use: Yes     Comment: not daily     Family History   Problem Relation Age of Onset    Lipids Mother     Diabetes Father     Cerebrovascular Disease Father     Heart Disease Father     Myocardial Infarction Father     Psychotic Disorder Sister         depression    Respiratory Daughter         cleft a rare     Neurologic Disorder Maternal Grandmother          age 50 brain tumor         Current Outpatient Medications   Medication Sig Dispense Refill    atorvastatin (LIPITOR) 20 MG tablet Take 1 tablet (20 mg) by mouth daily 90 tablet 3    glipiZIDE (GLUCOTROL XL) 10 MG 24 hr tablet Take 1 tablet (10 mg) by mouth daily 90 tablet 0    metFORMIN (GLUCOPHAGE XR) 500 MG 24 hr tablet Take 2 tablets (1,000 mg) by mouth 2 times daily (with meals) 360 tablet 3    alcohol swab prep pads Use to swab area of injection/leticia as directed. 100 each 3    blood glucose (NO BRAND SPECIFIED) test strip 1 strip by In Vitro route 2 times daily Use to test blood sugar 2 times daily AM and after dinner 100 strip 3    blood glucose calibration (NO BRAND SPECIFIED) solution To accompany: Blood Glucose Monitor Brands: per insurance. 2 each 0    blood glucose monitoring (ACCU-CHEK FASTCLIX) lancets Use to test blood sugar in the AM and after Dinner 102 each 3    blood glucose monitoring (NO BRAND SPECIFIED) meter device kit Use to test blood sugar 2 times daily or as directed. Preferred blood glucose meter OR supplies to accompany: Blood Glucose Monitor Brands: per insurance. 1 kit 0    blood glucose monitoring (NO BRAND SPECIFIED) test strip Use to test blood sugar 2 times daily or  as directed. (Patient not taking: Reported on 4/8/2022) 100 each 12    blood glucose monitoring (ONE TOUCH DELICA) lancets Use to test blood sugars 1 times daily or as directed. (Patient not taking: Reported on 4/8/2022) 1 Box 12    blood glucose monitoring (ONE TOUCH VERIO IQ) test strip Use to test blood sugar 2 times daily or as directed.  Ok to substitute alternative if insurance prefers. (Patient not taking: Reported on 4/8/2022) 100 strip 11    dulaglutide (TRULICITY) 0.75 MG/0.5ML pen Inject 0.75 mg Subcutaneous every 7 days (Patient not taking: Reported on 8/11/2023) 6 mL 0    insulin pen needle (BD NALINI U/F) 32G X 4 MM miscellaneous Use 1 daily as directed. 12 each 1    ORDER FOR DME Superfeet inserts size 9 (Patient not taking: Reported on 4/8/2022) 1 Device 0     No Known Allergies  Recent Labs   Lab Test 08/11/23  0834 07/07/23  1731 04/08/22  0838 08/31/20  1057 05/14/19  0844 12/03/18  0835 11/10/17  0836   A1C 8.3*  --  8.9* 9.4*   < > 9.0* 8.7*   LDL  --   --  140* Cannot estimate LDL when triglyceride exceeds 400 mg/dL  73  --  Cannot estimate LDL when triglyceride exceeds 400 mg/dL  82 68   HDL  --   --  38* 38*  --  35* 38*   TRIG  --   --  725* 620*  --  484* 300*   ALT  --  47 28  --   --  45  --    CR  --  0.63* 0.60* 0.56*  --  0.58*  --    GFRESTIMATED  --  >90 >90 >90  --  >90  --    GFRESTBLACK  --   --   --  >90  --  >90  --    POTASSIUM  --  4.1 4.2 4.1  --  4.0  --    TSH  --   --   --   --   --  0.55 0.78    < > = values in this interval not displayed.        Reviewed and updated as needed this visit by clinical staff   Tobacco  Allergies  Meds  Problems  Med Hx  Surg Hx  Fam Hx          Reviewed and updated as needed this visit by Provider                 Past Medical History:   Diagnosis Date    Pure hypercholesterolemia       Past Surgical History:   Procedure Laterality Date    ANGIOGRAM  3/2/2007    negative cath    ORTHOPEDIC SURGERY  2/2007    right ankle    SURGICAL  "HISTORY OF -   9/1974    Circumcision       Review of Systems   Constitutional:  Negative for chills and fever.   HENT:  Negative for congestion, ear pain, hearing loss and sore throat.    Eyes:  Negative for pain and visual disturbance.   Respiratory:  Negative for cough and shortness of breath.    Cardiovascular:  Negative for chest pain, palpitations and peripheral edema.   Gastrointestinal:  Negative for abdominal pain, constipation, diarrhea, heartburn, hematochezia and nausea.   Genitourinary:  Negative for dysuria, frequency, genital sores, hematuria, impotence, penile discharge and urgency.   Musculoskeletal:  Negative for arthralgias, joint swelling and myalgias.   Skin:  Negative for rash.   Neurological:  Negative for dizziness, weakness, headaches and paresthesias.   Psychiatric/Behavioral:  Negative for mood changes. The patient is not nervous/anxious.      OBJECTIVE:   /72 (BP Location: Right arm, Patient Position: Sitting, Cuff Size: Adult Regular)   Pulse 71   Temp 96.9  F (36.1  C) (Tympanic)   Resp 16   Ht 1.651 m (5' 5\")   Wt 67.1 kg (148 lb)   SpO2 98%   BMI 24.63 kg/m      Physical Exam  GENERAL: healthy, alert and no distress  EYES: Eyes grossly normal to inspection, PERRL and conjunctivae and sclerae normal  HENT: ear canals and TM's normal, nose and mouth without ulcers or lesions  NECK: no adenopathy, no asymmetry, masses, or scars and thyroid normal to palpation  RESP: lungs clear to auscultation - no rales, rhonchi or wheezes  CV: regular rate and rhythm, normal S1 S2, no S3 or S4, no murmur, click or rub, no peripheral edema and peripheral pulses strong  ABDOMEN: soft, nontender, no hepatosplenomegaly, no masses and bowel sounds normal  MS: no gross musculoskeletal defects noted, no edema  SKIN: no suspicious lesions or rashes  NEURO: Normal strength and tone, mentation intact and speech normal  PSYCH: mentation appears normal, affect normal/bright        ASSESSMENT/PLAN: "       ICD-10-CM    1. Routine general medical examination at a health care facility  Z00.00       2. Type 2 diabetes mellitus without complication, without long-term current use of insulin (H)  E11.9 metFORMIN (GLUCOPHAGE XR) 500 MG 24 hr tablet     glipiZIDE (GLUCOTROL XL) 10 MG 24 hr tablet     semaglutide (OZEMPIC) 2 MG/3ML pen      3. Hyperlipidemia LDL goal <100  E78.5 HEMOGLOBIN A1C     Lipid panel reflex to direct LDL Non-fasting     Albumin Random Urine Quantitative with Creat Ratio     Adult Eye  Referral     atorvastatin (LIPITOR) 20 MG tablet      4. Screen for colon cancer  Z12.11 Colonoscopy Screening  Referral        Healthy Male exam completed today.  I discussed preventative measures with the patient including continuing with lifestyle modifications of a balanced diet and regular cardiovascular exercise.  I discussed self testicular exams and how to complete these.  Diabetes uncontrolled. Discussed importance of taking medication on a daily basis. I will add in semaglutide. He is to let me know if medication is not well covered and too costly. Follow up in 3 months for diabetes follow up.     Patient has been advised of split billing requirements and indicates understanding: Yes    Patient has been advised of split billing requirements and indicates understanding: Yes      COUNSELING:   Reviewed preventive health counseling, as reflected in patient instructions       Regular exercise       Healthy diet/nutrition       Colorectal cancer screening        He reports that he has quit smoking. His smoking use included cigarettes and cigars. His smokeless tobacco use includes chew.            MELANIE Hameed CNP  [facility]

## 2024-02-26 ENCOUNTER — MYC MEDICAL ADVICE (OUTPATIENT)
Dept: FAMILY MEDICINE | Facility: CLINIC | Age: 49
End: 2024-02-26
Payer: COMMERCIAL

## 2024-02-26 NOTE — TELEPHONE ENCOUNTER
Patient Quality Outreach    Patient is due for the following:   Diabetes -  Diabetic Follow-Up Visit    Next Steps:   Schedule a office visit for diabetes    Type of outreach:    Sent [employer] message.      Questions for provider review:    None           Meagan Gonzalez, CMA

## 2024-04-07 ENCOUNTER — HEALTH MAINTENANCE LETTER (OUTPATIENT)
Age: 49
End: 2024-04-07

## 2024-05-30 ENCOUNTER — PATIENT OUTREACH (OUTPATIENT)
Dept: FAMILY MEDICINE | Facility: CLINIC | Age: 49
End: 2024-05-30
Payer: COMMERCIAL

## 2024-05-31 ENCOUNTER — HOSPITAL ENCOUNTER (EMERGENCY)
Facility: CLINIC | Age: 49
Discharge: HOME | End: 2024-05-31
Attending: PHYSICIAN ASSISTANT | Admitting: PHYSICIAN ASSISTANT
Payer: COMMERCIAL

## 2024-05-31 VITALS
HEART RATE: 93 BPM | DIASTOLIC BLOOD PRESSURE: 83 MMHG | OXYGEN SATURATION: 96 % | RESPIRATION RATE: 16 BRPM | TEMPERATURE: 98.3 F | SYSTOLIC BLOOD PRESSURE: 135 MMHG

## 2024-05-31 DIAGNOSIS — R21 RASH: ICD-10-CM

## 2024-05-31 PROCEDURE — G0463 HOSPITAL OUTPT CLINIC VISIT: HCPCS | Performed by: PHYSICIAN ASSISTANT

## 2024-05-31 PROCEDURE — 99213 OFFICE O/P EST LOW 20 MIN: CPT | Performed by: PHYSICIAN ASSISTANT

## 2024-05-31 RX ORDER — PREDNISONE 20 MG/1
TABLET ORAL
Qty: 9 TABLET | Refills: 0 | Status: SHIPPED | OUTPATIENT
Start: 2024-05-31 | End: 2024-06-06

## 2024-05-31 ASSESSMENT — COLUMBIA-SUICIDE SEVERITY RATING SCALE - C-SSRS
6. HAVE YOU EVER DONE ANYTHING, STARTED TO DO ANYTHING, OR PREPARED TO DO ANYTHING TO END YOUR LIFE?: NO
2. HAVE YOU ACTUALLY HAD ANY THOUGHTS OF KILLING YOURSELF IN THE PAST MONTH?: NO
1. IN THE PAST MONTH, HAVE YOU WISHED YOU WERE DEAD OR WISHED YOU COULD GO TO SLEEP AND NOT WAKE UP?: NO

## 2024-05-31 ASSESSMENT — ACTIVITIES OF DAILY LIVING (ADL): ADLS_ACUITY_SCORE: 33

## 2024-05-31 NOTE — DISCHARGE INSTRUCTIONS
Use prescription prednisone as directed.  Can cause jitteriness, difficulty sleeping and increased appetite.  Recommend that you take this in the morning    Recommend once daily Zyrtec or Allegra or Claritin for 7 days    Return if symptoms worsen/change.

## 2024-05-31 NOTE — ED PROVIDER NOTES
History   No chief complaint on file.    HPI  Aubrey Ochoa is a 48 year old male who presents today with rash to arms, chest, and groin that started today. Patient states it is itchy. He was golfing yesterday, but no known exposures and he was not walking in tall grass or in the woods.  He denies any new soaps, detergents, foods, clothing or medications.  He denies any other symptoms.  He states he took a Benadryl which helped with the itching slightly for short period of time but did not improve the rash.  He denies any shortness of breath, chest pain, abdominal pain, nausea or vomiting, facial swelling    Allergies:  No Known Allergies    Problem List:    Patient Active Problem List    Diagnosis Date Noted    Type 2 diabetes mellitus without complications (H) 10/01/2013     Priority: Medium    Hyperlipidemia LDL goal <100 2011     Priority: Medium    Nose deformities, acquired 2011     Priority: Medium    Nonspecific abnormal results of liver function study 2005     Priority: Medium     slightly elevated ALT on Lipitor           Past Medical History:    Past Medical History:   Diagnosis Date    Pure hypercholesterolemia        Past Surgical History:    Past Surgical History:   Procedure Laterality Date    ANGIOGRAM  3/2/2007    negative cath    ORTHOPEDIC SURGERY  2007    right ankle    SURGICAL HISTORY OF -   1974    Circumcision       Family History:    Family History   Problem Relation Age of Onset    Lipids Mother     Diabetes Father     Cerebrovascular Disease Father     Heart Disease Father     Myocardial Infarction Father     Psychotic Disorder Sister         depression    Respiratory Daughter         cleft a rare     Neurologic Disorder Maternal Grandmother          age 50 brain tumor       Social History:  Marital Status:   [2]  Social History     Tobacco Use    Smoking status: Former     Types: Cigarettes, Cigars    Smokeless tobacco: Current     Types: Chew     Last  attempt to quit: 2/29/2020    Tobacco comments:     occ cigar  weekly,    Vaping Use    Vaping status: Never Used   Substance Use Topics    Alcohol use: Yes     Comment: not daily    Drug use: No        Medications:    predniSONE (DELTASONE) 20 MG tablet  alcohol swab prep pads  atorvastatin (LIPITOR) 20 MG tablet  blood glucose (NO BRAND SPECIFIED) test strip  blood glucose calibration (NO BRAND SPECIFIED) solution  blood glucose monitoring (ACCU-CHEK FASTCLIX) lancets  blood glucose monitoring (NO BRAND SPECIFIED) meter device kit  blood glucose monitoring (NO BRAND SPECIFIED) test strip  blood glucose monitoring (ONE TOUCH DELICA) lancets  blood glucose monitoring (ONE TOUCH VERIO IQ) test strip  dulaglutide (TRULICITY) 0.75 MG/0.5ML pen  glipiZIDE (GLUCOTROL XL) 10 MG 24 hr tablet  insulin pen needle (BD NALINI U/F) 32G X 4 MM miscellaneous  metFORMIN (GLUCOPHAGE XR) 500 MG 24 hr tablet  ORDER FOR DME  semaglutide (OZEMPIC) 2 MG/3ML pen          Review of Systems   Skin:  Positive for rash.   All other systems reviewed and are negative.      Physical Exam   BP: 135/83  Pulse: 93  Temp: 98.3  F (36.8  C)  Resp: 16  SpO2: 96 %      Physical Exam  Vitals reviewed.   Constitutional:       Appearance: Normal appearance. He is normal weight.   Eyes:      General: No scleral icterus.     Extraocular Movements: Extraocular movements intact.      Conjunctiva/sclera: Conjunctivae normal.      Pupils: Pupils are equal, round, and reactive to light.   Pulmonary:      Effort: Pulmonary effort is normal.   Musculoskeletal:         General: Normal range of motion.   Skin:     General: Skin is warm.      Capillary Refill: Capillary refill takes less than 2 seconds.      Findings: Rash (Slight urticarial rash to the upper arms, belt line and a few spots on the chest.) present.   Neurological:      General: No focal deficit present.      Mental Status: He is alert and oriented to person, place, and time.   Psychiatric:         Mood  and Affect: Mood normal.         Behavior: Behavior normal.         Thought Content: Thought content normal.         Judgment: Judgment normal.         ED Course        Procedures             Critical Care time:  none               No results found for this or any previous visit (from the past 24 hour(s)).    Medications - No data to display    Assessments & Plan (with Medical Decision Making)     I have reviewed the nursing notes.    I have reviewed the findings, diagnosis, plan and need for follow up with the patient.   Aubrey Ochoa is a 48 year old male who presents today with rash to arms, chest, and groin that started today. Patient states it is itchy. He was golfing yesterday, but no known exposures and he was not walking in tall grass or in the woods.  He denies any new soaps, detergents, foods, clothing or medications.  He denies any other symptoms.  He states he took a Benadryl which helped with the itching slightly for short period of time but did not improve the rash.  He denies any shortness of breath, chest pain, abdominal pain, nausea or vomiting, facial swelling    Mild urticarial rash noted on exam.  Discussed with patient switching to a 24-hour nondrowsy antihistamine like Claritin, Zyrtec or Allegra daily for the next 7 days.  Prescription for prednisone sent to the pharmacy to fill and take for the next few days.  Discussed side effects with this medication.  Patient in agreement this plan and discharged in stable condition.  No concerns for anaphylactic reaction at this time.    New Prescriptions    PREDNISONE (DELTASONE) 20 MG TABLET    Take 2 tablets (40 mg) by mouth daily for 3 days, THEN 1 tablet (20 mg) daily for 3 days. Take two tablets (= 40mg) each day for 5 (five) days       Final diagnoses:   Rash       5/31/2024   [facility] EMERGENCY DEPT       Cindy Bryson PA-C  05/31/24 2014

## 2024-06-25 DIAGNOSIS — E11.9 TYPE 2 DIABETES MELLITUS WITHOUT COMPLICATION, WITHOUT LONG-TERM CURRENT USE OF INSULIN (H): ICD-10-CM

## 2024-06-25 RX ORDER — GLIPIZIDE 10 MG/1
10 TABLET, FILM COATED, EXTENDED RELEASE ORAL DAILY
Qty: 90 TABLET | Refills: 0 | Status: SHIPPED | OUTPATIENT
Start: 2024-06-25

## 2024-06-25 NOTE — TELEPHONE ENCOUNTER
Requested Prescriptions   Pending Prescriptions Disp Refills    glipiZIDE (GLUCOTROL XL) 10 MG 24 hr tablet [Pharmacy Med Name: glipiZIDE ER 10 MG Oral Tablet Extended Release 24 Hour] 90 tablet 0     Sig: Take 1 tablet by mouth once daily       Sulfonylurea Agents Failed - 6/25/2024 10:02 AM        Failed - Patient has documented A1c within the specified period of time.     If HgbA1C is 8 or greater, it needs to be on file within the past 3 months.  If less than 8, must be on file within the past 6 months.     Recent Labs   Lab Test 08/11/23  0834   A1C 8.3*             Failed - Recent (6 mo) or future (90 days) visit within the authorizing provider's specialty     The patient must have completed an in-person or virtual visit within the past 6 months or has a future visit scheduled within the next 90 days with the authorizing provider s specialty.  Urgent care and e-visits do not quality as an office visit for this protocol.          Failed - Patient has a recent creatinine (normal) within the past 12 mos.     Recent Labs   Lab Test 07/07/23  1731   CR 0.63*                 Passed - Medication is active on med list        Passed - Has GFR on file in past 12 months and most recent value is normal        Passed - Medication indicated for associated diagnosis     Medication is associated with one or more of the following diagnoses:  Maturity-onset diabetes of the young   Peripheral circulatory disorder due to type 2 diabetes mellitus   Type 2 diabetes mellitus           Passed - Patient is age 18 or older

## 2024-07-12 ENCOUNTER — PATIENT OUTREACH (OUTPATIENT)
Dept: CARE COORDINATION | Facility: CLINIC | Age: 49
End: 2024-07-12
Payer: COMMERCIAL

## 2024-07-26 ENCOUNTER — PATIENT OUTREACH (OUTPATIENT)
Dept: CARE COORDINATION | Facility: CLINIC | Age: 49
End: 2024-07-26
Payer: COMMERCIAL

## 2024-07-29 ENCOUNTER — MYC MEDICAL ADVICE (OUTPATIENT)
Dept: FAMILY MEDICINE | Facility: CLINIC | Age: 49
End: 2024-07-29
Payer: COMMERCIAL

## 2024-07-29 NOTE — TELEPHONE ENCOUNTER
Patient Quality Outreach    Patient is due for the following:   Diabetes -  Diabetic Follow-Up Visit  Colon Cancer Screening  Physical Preventive Adult Physical    Next Steps:   Schedule a Adult Preventative    Type of outreach:    Sent [employer] message.      Questions for provider review:    None           Meagan Gonzalez, CMA

## 2024-10-22 ENCOUNTER — OFFICE VISIT (OUTPATIENT)
Dept: FAMILY MEDICINE | Facility: CLINIC | Age: 49
End: 2024-10-22
Payer: COMMERCIAL

## 2024-10-22 VITALS
HEART RATE: 69 BPM | DIASTOLIC BLOOD PRESSURE: 76 MMHG | RESPIRATION RATE: 16 BRPM | SYSTOLIC BLOOD PRESSURE: 122 MMHG | WEIGHT: 153.6 LBS | OXYGEN SATURATION: 98 % | BODY MASS INDEX: 25.59 KG/M2 | HEIGHT: 65 IN | TEMPERATURE: 97 F

## 2024-10-22 DIAGNOSIS — E11.9 TYPE 2 DIABETES MELLITUS WITHOUT COMPLICATION, WITHOUT LONG-TERM CURRENT USE OF INSULIN (H): ICD-10-CM

## 2024-10-22 DIAGNOSIS — E78.5 HYPERLIPIDEMIA LDL GOAL <100: ICD-10-CM

## 2024-10-22 DIAGNOSIS — Z12.11 SCREEN FOR COLON CANCER: ICD-10-CM

## 2024-10-22 DIAGNOSIS — Z00.00 ROUTINE GENERAL MEDICAL EXAMINATION AT A HEALTH CARE FACILITY: Primary | ICD-10-CM

## 2024-10-22 LAB
ANION GAP SERPL CALCULATED.3IONS-SCNC: 12 MMOL/L (ref 7–15)
BUN SERPL-MCNC: 11.5 MG/DL (ref 6–20)
CALCIUM SERPL-MCNC: 9.5 MG/DL (ref 8.8–10.4)
CHLORIDE SERPL-SCNC: 98 MMOL/L (ref 98–107)
CHOLEST SERPL-MCNC: 243 MG/DL
CREAT SERPL-MCNC: 0.56 MG/DL (ref 0.67–1.17)
EGFRCR SERPLBLD CKD-EPI 2021: >90 ML/MIN/1.73M2
EST. AVERAGE GLUCOSE BLD GHB EST-MCNC: 226 MG/DL
FASTING STATUS PATIENT QL REPORTED: YES
FASTING STATUS PATIENT QL REPORTED: YES
GLUCOSE SERPL-MCNC: 249 MG/DL (ref 70–99)
HBA1C MFR BLD: 9.5 % (ref 0–5.6)
HCO3 SERPL-SCNC: 25 MMOL/L (ref 22–29)
HDLC SERPL-MCNC: 41 MG/DL
LDLC SERPL CALC-MCNC: ABNORMAL MG/DL
LDLC SERPL DIRECT ASSAY-MCNC: 142 MG/DL
NONHDLC SERPL-MCNC: 202 MG/DL
POTASSIUM SERPL-SCNC: 4.1 MMOL/L (ref 3.4–5.3)
SODIUM SERPL-SCNC: 135 MMOL/L (ref 135–145)
TRIGL SERPL-MCNC: 421 MG/DL

## 2024-10-22 PROCEDURE — 36415 COLL VENOUS BLD VENIPUNCTURE: CPT | Performed by: NURSE PRACTITIONER

## 2024-10-22 PROCEDURE — 99214 OFFICE O/P EST MOD 30 MIN: CPT | Mod: 25 | Performed by: NURSE PRACTITIONER

## 2024-10-22 PROCEDURE — 83721 ASSAY OF BLOOD LIPOPROTEIN: CPT | Performed by: NURSE PRACTITIONER

## 2024-10-22 PROCEDURE — 83036 HEMOGLOBIN GLYCOSYLATED A1C: CPT | Performed by: NURSE PRACTITIONER

## 2024-10-22 PROCEDURE — 99396 PREV VISIT EST AGE 40-64: CPT | Mod: 25 | Performed by: NURSE PRACTITIONER

## 2024-10-22 PROCEDURE — 80061 LIPID PANEL: CPT | Mod: 59 | Performed by: NURSE PRACTITIONER

## 2024-10-22 PROCEDURE — 80048 BASIC METABOLIC PNL TOTAL CA: CPT | Performed by: NURSE PRACTITIONER

## 2024-10-22 RX ORDER — METFORMIN HYDROCHLORIDE 500 MG/1
1000 TABLET, EXTENDED RELEASE ORAL 2 TIMES DAILY WITH MEALS
Qty: 360 TABLET | Refills: 3 | Status: SHIPPED | OUTPATIENT
Start: 2024-10-22

## 2024-10-22 RX ORDER — GLIPIZIDE 10 MG/1
10 TABLET, FILM COATED, EXTENDED RELEASE ORAL DAILY
Qty: 90 TABLET | Refills: 0 | Status: SHIPPED | OUTPATIENT
Start: 2024-10-22

## 2024-10-22 RX ORDER — ATORVASTATIN CALCIUM 20 MG/1
20 TABLET, FILM COATED ORAL DAILY
Qty: 90 TABLET | Refills: 3 | Status: SHIPPED | OUTPATIENT
Start: 2024-10-22

## 2024-10-22 SDOH — HEALTH STABILITY: PHYSICAL HEALTH: ON AVERAGE, HOW MANY DAYS PER WEEK DO YOU ENGAGE IN MODERATE TO STRENUOUS EXERCISE (LIKE A BRISK WALK)?: 2 DAYS

## 2024-10-22 SDOH — HEALTH STABILITY: PHYSICAL HEALTH: ON AVERAGE, HOW MANY MINUTES DO YOU ENGAGE IN EXERCISE AT THIS LEVEL?: 20 MIN

## 2024-10-22 ASSESSMENT — SOCIAL DETERMINANTS OF HEALTH (SDOH): HOW OFTEN DO YOU GET TOGETHER WITH FRIENDS OR RELATIVES?: TWICE A WEEK

## 2024-10-22 ASSESSMENT — PAIN SCALES - GENERAL: PAINLEVEL: NO PAIN (0)

## 2024-10-22 NOTE — PATIENT INSTRUCTIONS
Patient Education   Preventive Care Advice   This is general advice given by our system to help you stay healthy. However, your care team may have specific advice just for you. Please talk to your care team about your preventive care needs.  Nutrition  Eat 5 or more servings of fruits and vegetables each day.  Try wheat bread, brown rice and whole grain pasta (instead of white bread, rice, and pasta).  Get enough calcium and vitamin D. Check the label on foods and aim for 100% of the RDA (recommended daily allowance).  Lifestyle  Exercise at least 150 minutes each week  (30 minutes a day, 5 days a week).  Do muscle strengthening activities 2 days a week. These help control your weight and prevent disease.  No smoking.  Wear sunscreen to prevent skin cancer.  Have a dental exam and cleaning every 6 months.  Yearly exams  See your health care team every year to talk about:  Any changes in your health.  Any medicines your care team has prescribed.  Preventive care, family planning, and ways to prevent chronic diseases.  Shots (vaccines)   HPV shots (up to age 26), if you've never had them before.  Hepatitis B shots (up to age 59), if you've never had them before.  COVID-19 shot: Get this shot when it's due.  Flu shot: Get a flu shot every year.  Tetanus shot: Get a tetanus shot every 10 years.  Pneumococcal, hepatitis A, and RSV shots: Ask your care team if you need these based on your risk.  Shingles shot (for age 50 and up)  General health tests  Diabetes screening:  Starting at age 35, Get screened for diabetes at least every 3 years.  If you are younger than age 35, ask your care team if you should be screened for diabetes.  Cholesterol test: At age 39, start having a cholesterol test every 5 years, or more often if advised.  Bone density scan (DEXA): At age 50, ask your care team if you should have this scan for osteoporosis (brittle bones).  Hepatitis C: Get tested at least once in your life.  STIs (sexually  transmitted infections)  Before age 24: Ask your care team if you should be screened for STIs.  After age 24: Get screened for STIs if you're at risk. You are at risk for STIs (including HIV) if:  You are sexually active with more than one person.  You don't use condoms every time.  You or a partner was diagnosed with a sexually transmitted infection.  If you are at risk for HIV, ask about PrEP medicine to prevent HIV.  Get tested for HIV at least once in your life, whether you are at risk for HIV or not.  Cancer screening tests  Cervical cancer screening: If you have a cervix, begin getting regular cervical cancer screening tests starting at age 21.  Breast cancer scan (mammogram): If you've ever had breasts, begin having regular mammograms starting at age 40. This is a scan to check for breast cancer.  Colon cancer screening: It is important to start screening for colon cancer at age 45.  Have a colonoscopy test every 10 years (or more often if you're at risk) Or, ask your provider about stool tests like a FIT test every year or Cologuard test every 3 years.  To learn more about your testing options, visit:   .  For help making a decision, visit:   https://bit.ly/xt35802.  Prostate cancer screening test: If you have a prostate, ask your care team if a prostate cancer screening test (PSA) at age 55 is right for you.  Lung cancer screening: If you are a current or former smoker ages 50 to 80, ask your care team if ongoing lung cancer screenings are right for you.  For informational purposes only. Not to replace the advice of your health care provider. Copyright   2023 [facility]. All rights reserved. Clinically reviewed by the [facility]. Telebit [id number] - REV 01/24.  9 Ways to Cut Back on Drinking  Maybe you've found yourself drinking more alcohol than you'd prefer. If you want to cut back, here are some ideas to try.    Think before you drink.  Do you really want a drink,  "or is it just a habit? If you're used to having a drink at a certain time, try doing something else then.     Look for substitutes.  Find some no-alcohol drinks that you enjoy, like flavored seltzer water, tea with honey, or tonic with a slice of lime. Or try alcohol-free beer or \"virgin\" cocktails (without the alcohol).     Drink more water.  Use water to quench your thirst. Drink a glass of water before you have any alcohol. Have another glass along with every drink or between drinks.     Shrink your drink.  For example, have a bottle of beer instead of a pint. Use a smaller glass for wine. Choose drinks with lower alcohol content (ABV%). Or use less liquor and more mixer in cocktails.     Slow down.  It's easy to drink quickly and without thinking about it. Pay attention, and make each drink last longer.     Do the math.  Total up how much you spend on alcohol each month. How much is that a year? If you cut back, what could you do with the money you save?     Take a break.  Choose a day or two each week when you won't drink at all. Notice how you feel on those days, physically and emotionally. How did you sleep? Do you feel better? Over time, add more break days.     Count calories.  Would you like to lose some weight? For some people that's a good motivator for cutting back. Figure out how many calories are in each drink. How many does that add up to in a day? In a week? In a month?     Practice saying no.  Be ready when someone offers you a drink. Try: \"Thanks, I've had enough.\" Or \"Thanks, but I'm cutting back.\" Or \"No, thanks. I feel better when I drink less.\"   Current as of: November 15, 2023  Content Version: 14.2 2024 [employer].   Care instructions adapted under license by your healthcare professional. If you have questions about a medical condition or this instruction, always ask your healthcare professional. [employer] disclaims any warranty or liability for your use of " this information.

## 2024-10-22 NOTE — LETTER
October 28, 2024      Aubrey Ochoa  [id number] [facility] [id number]        Dear ,    We are writing to inform you of your test results.    Overall labs are consistent with previous evaluations. Focus on taking your medications as prescribed and trying to not miss them will dramatically improve your diabetes as well as your cholesterol. Goal LDL cholesterol is <100. Yours is currently 142.      Follow up in 3 months.     Resulted Orders   HEMOGLOBIN A1C   Result Value Ref Range    Estimated Average Glucose 226 (H) <117 mg/dL    Hemoglobin A1C 9.5 (H) 0.0 - 5.6 %      Comment:      Normal <5.7%   Prediabetes 5.7-6.4%    Diabetes 6.5% or higher     Note: Adopted from ADA consensus guidelines.   BASIC METABOLIC PANEL   Result Value Ref Range    Sodium 135 135 - 145 mmol/L    Potassium 4.1 3.4 - 5.3 mmol/L    Chloride 98 98 - 107 mmol/L    Carbon Dioxide (CO2) 25 22 - 29 mmol/L    Anion Gap 12 7 - 15 mmol/L    Urea Nitrogen 11.5 6.0 - 20.0 mg/dL    Creatinine 0.56 (L) 0.67 - 1.17 mg/dL    GFR Estimate >90 >60 mL/min/1.73m2      Comment:      eGFR calculated using 2021 CKD-EPI equation.    Calcium 9.5 8.8 - 10.4 mg/dL      Comment:      Reference intervals for this test were updated on 7/16/2024 to reflect our healthy population more accurately. There may be differences in the flagging of prior results with similar values performed with this method. Those prior results can be interpreted in the context of the updated reference intervals.    Glucose 249 (H) 70 - 99 mg/dL    Patient Fasting > 8hrs? Yes    Lipid panel reflex to direct LDL Non-fasting   Result Value Ref Range    Cholesterol 243 (H) <200 mg/dL    Triglycerides 421 (H) <150 mg/dL    Direct Measure HDL 41 >=40 mg/dL    LDL Cholesterol Calculated        Comment:      Cannot estimate LDL when triglyceride exceeds 400 mg/dL    Non HDL Cholesterol 202 (H) <130 mg/dL    Patient Fasting > 8hrs? Yes     Narrative    Cholesterol  Desirable: < 200  mg/dL  Borderline High: 200 - 239 mg/dL  High: >= 240 mg/dL    Triglycerides  Normal: < 150 mg/dL  Borderline High: 150 - 199 mg/dL  High: 200-499 mg/dL  Very High: >= 500 mg/dL    Direct Measure HDL  Female: >= 50 mg/dL   Male: >= 40 mg/dL    LDL Cholesterol  Desirable: < 100 mg/dL  Above Desirable: 100 - 129 mg/dL   Borderline High: 130 - 159 mg/dL   High:  160 - 189 mg/dL   Very High: >= 190 mg/dL    Non HDL Cholesterol  Desirable: < 130 mg/dL  Above Desirable: 130 - 159 mg/dL  Borderline High: 160 - 189 mg/dL  High: 190 - 219 mg/dL  Very High: >= 220 mg/dL   LDL cholesterol direct   Result Value Ref Range    LDL Cholesterol Direct 142 (H) <100 mg/dL      Comment:      Age 2-19 years:  Desirable: < 110 mg/dL   Borderline High: 110-129 mg/dL   High: >= 130 mg/dL    Age 20 years and older:  Desirable: < 100 mg/dL  Above Desirable: 100-129 mg/dL   Borderline High: 130-159 mg/dL   High: 160-189 mg/dL   Very High: >= 190 mg/dL       If you have any questions or concerns, please call the clinic at the number listed above.       Sincerely,      MELANIE Castillo CNP

## 2024-10-22 NOTE — PROGRESS NOTES
"Preventive Care Visit  [facility]  MELANIE Hameed CNP, Nurse Practitioner - Family  Oct 22, 2024      Assessment & Plan     Routine general medical examination at a health care facility  Healthy Male exam completed today.  I discussed preventative measures with the patient including continuing with lifestyle modifications of a balanced diet and regular cardiovascular exercise.  I encouraged patient to follow-up yearly for annual physicals and sooner as needed.        Hyperlipidemia LDL goal <100  Continue on statin, no changes recommended at this time. Rechecking lipids today.   - atorvastatin (LIPITOR) 20 MG tablet; Take 1 tablet (20 mg) by mouth daily.    Type 2 diabetes mellitus without complication, without long-term current use of insulin (H)  Did not continue with GLP-1 due to cost. Has been on metformin and glipizide. Will start januvia today. Hemoglobin a1c is 9.5% discussed diabetes management and taking medications on a regular basis. Foot exam normal.   - OPTOMETRY REFERRAL; Future  - Albumin Random Urine Quantitative with Creat Ratio; Future  - glipiZIDE (GLUCOTROL XL) 10 MG 24 hr tablet; Take 1 tablet (10 mg) by mouth daily.  - metFORMIN (GLUCOPHAGE XR) 500 MG 24 hr tablet; Take 2 tablets (1,000 mg) by mouth 2 times daily (with meals).  - sitagliptin (JANUVIA) 100 MG tablet; Take 1 tablet (100 mg) by mouth daily.  - FOOT EXAM    Screen for colon cancer  - Colonoscopy Screening  Referral; Future    Patient has been advised of split billing requirements and indicates understanding: Yes        BMI  Estimated body mass index is 25.56 kg/m  as calculated from the following:    Height as of this encounter: 1.651 m (5' 5\").    Weight as of this encounter: 69.7 kg (153 lb 9.6 oz).     Counseling  Appropriate preventive services were addressed with this patient via screening, questionnaire, or discussion as appropriate for fall prevention, nutrition, physical activity, " Tobacco-use cessation, social engagement, weight loss and cognition.  Checklist reviewing preventive services available has been given to the patient.  Reviewed patient's diet, addressing concerns and/or questions.   He is at risk for lack of exercise and has been provided with information to increase physical activity for the benefit of his well-being.   The patient was instructed to see the dentist every 6 months.   The patient reports drinking more than 3 alcoholic drinks per day and/or more than 7 drhnks per week. The patient was counseled and given information about possible harmful effects of excessive alcohol intake.      Shad Burgos is a 49 year old, presenting for the following:  Physical and Diabetes        10/22/2024     8:59 AM   Additional Questions   Roomed by Elizabeth KRUGER MA        Health Care Directive  Patient does not have a Health Care Directive or Living Will: Discussed advance care planning with patient; however, patient declined at this time.    HPI    GLP-1 has not been affordable for him.     Diabetes Follow-up  How often are you checking your blood sugar? A few times a month  What time of day are you checking your blood sugars (select all that apply)?  Before meals  Have you had any blood sugars above 200?  Yes   Have you had any blood sugars below 70?  No  What symptoms do you notice when your blood sugar is low?  Shaky, Dizzy, and Weak  What concerns do you have today about your diabetes? None   Do you have any of these symptoms? (Select all that apply)  No numbness or tingling in feet.  No redness, sores or blisters on feet.  No complaints of excessive thirst.  No reports of blurry vision.  No significant changes to weight.  Have you had a diabetic eye exam in the last 12 months? No    BP Readings from Last 2 Encounters:   10/22/24 122/76   05/31/24 135/83     Hemoglobin A1C (%)   Date Value   10/22/2024 9.5 (H)   08/11/2023 8.3 (H)   08/31/2020 9.4 (H)   05/14/2019 7.2 (H)     LDL  Cholesterol Calculated   Date Value   08/11/2023      Comment:     Cannot estimate LDL when triglyceride exceeds 400 mg/dL   04/08/2022      Comment:     Cannot estimate LDL when triglyceride exceeds 400 mg/dL   08/31/2020     Cannot estimate LDL when triglyceride exceeds 400 mg/dL mg/dL   12/03/2018     Cannot estimate LDL when triglyceride exceeds 400 mg/dL mg/dL     LDL Cholesterol Direct (mg/dL)   Date Value   08/11/2023 111 (H)   04/08/2022 140 (H)   08/31/2020 73   12/03/2018 82             Hyperlipidemia Follow-Up  Are you regularly taking any medication or supplement to lower your cholesterol?   Yes- Atorvastatin  Are you having muscle aches or other side effects that you think could be caused by your cholesterol lowering medication?  No        10/22/2024   General Health   How would you rate your overall physical health? Good   Feel stress (tense, anxious, or unable to sleep) To some extent      (!) STRESS CONCERN      10/22/2024   Nutrition   Three or more servings of calcium each day? (!) NO   Diet: Regular (no restrictions)    Diabetic   How many servings of fruit and vegetables per day? (!) 0-1   How many sweetened beverages each day? 0-1       Multiple values from one day are sorted in reverse-chronological order         10/22/2024   Exercise   Days per week of moderate/strenous exercise 2 days   Average minutes spent exercising at this level 20 min      (!) EXERCISE CONCERN      10/22/2024   Social Factors   Frequency of gathering with friends or relatives Twice a week   Worry food won't last until get money to buy more No   Food not last or not have enough money for food? No   Do you have housing? (Housing is defined as stable permanent housing and does not include staying ouside in a car, in a tent, in an abandoned building, in an overnight shelter, or couch-surfing.) Yes   Are you worried about losing your housing? No   Lack of transportation? No   Unable to get utilities (heat,electricity)? No             10/22/2024   Fall Risk   Fallen 2 or more times in the past year? No   Trouble with walking or balance? No             10/22/2024   Dental   Dentist two times every year? (!) NO            10/22/2024   TB Screening   Were you born outside of the [place]? No            Today's PHQ-2 Score:       10/22/2024     9:04 AM   PHQ-2 ( 1999 [employer])   Q1: Little interest or pleasure in doing things 0   Q2: Feeling down, depressed or hopeless 0   PHQ-2 Score 0   Q1: Little interest or pleasure in doing things Not at all   Q2: Feeling down, depressed or hopeless Not at all   PHQ-2 Score 0           10/22/2024   Substance Use   Alcohol more than 3/day or more than 7/wk Yes   How often do you have a drink containing alcohol 2 to 3 times a week   How many alcohol drinks on typical day 3 or 4   How often do you have 5+ drinks at one occasion Monthly   Audit 2/3 Score 3   How often not able to stop drinking once started Never   How often failed to do what normally expected Never   How often needed first drink in am after a heavy drinking session Never   How often feeling of guilt or remorse after drinking Never   How often unable to remember what happened the night before Less than monthly   Have you or someone else been injured because of your drinking No   Has anyone been concerned or suggested you cut down on drinking No   TOTAL SCORE - AUDIT 7   Do you use any other substances recreationally? No        Social History     Tobacco Use    Smoking status: Former     Types: Cigarettes, Cigars    Smokeless tobacco: Current     Types: Chew     Last attempt to quit: 2/29/2020    Tobacco comments:     occ cigar  weekly,    Vaping Use    Vaping status: Never Used   Substance Use Topics    Alcohol use: Yes     Comment: not daily    Drug use: No           10/22/2024   STI Screening   New sexual partner(s) since last STI/HIV test? No      ASCVD Risk   The 10-year ASCVD risk score (Rene BLANCO, et al., 2019) is: 8.7%    Values used  to calculate the score:      Age: 49 years      Sex: Male      Is Non- : No      Diabetic: Yes      Tobacco smoker: No      Systolic Blood Pressure: 122 mmHg      Is BP treated: No      HDL Cholesterol: 38 mg/dL      Total Cholesterol: 211 mg/dL          10/22/2024   Contraception/Family Planning   Questions about contraception or family planning No           Reviewed and updated as needed this visit by Provider   Tobacco  Allergies  Meds  Problems  Med Hx  Surg Hx  Fam Hx            Past Medical History:   Diagnosis Date    Pure hypercholesterolemia      Past Surgical History:   Procedure Laterality Date    ANGIOGRAM  3/2/2007    negative cath    ORTHOPEDIC SURGERY  2/2007    right ankle    SURGICAL HISTORY OF - 9/1974    Circumcision     Lab work is in process  Labs reviewed in EPIC  BP Readings from Last 3 Encounters:   10/22/24 122/76   05/31/24 135/83   08/11/23 110/72    Wt Readings from Last 3 Encounters:   10/22/24 69.7 kg (153 lb 9.6 oz)   08/11/23 67.1 kg (148 lb)   04/08/22 68 kg (150 lb)                  Patient Active Problem List   Diagnosis    Nonspecific abnormal results of liver function study    Hyperlipidemia LDL goal <100    Nose deformities, acquired    Type 2 diabetes mellitus without complications (H)     Past Surgical History:   Procedure Laterality Date    ANGIOGRAM  3/2/2007    negative cath    ORTHOPEDIC SURGERY  2/2007    right ankle    SURGICAL HISTORY OF - 9/1974    Circumcision       Social History     Tobacco Use    Smoking status: Former     Types: Cigarettes, Cigars    Smokeless tobacco: Current     Types: Chew     Last attempt to quit: 2/29/2020    Tobacco comments:     occ cigar  weekly,    Substance Use Topics    Alcohol use: Yes     Comment: not daily     Family History   Problem Relation Age of Onset    Lipids Mother     Diabetes Father     Cerebrovascular Disease Father     Heart Disease Father     Myocardial Infarction Father     Psychotic  Disorder Sister         depression    Respiratory Daughter         cleft a rare     Neurologic Disorder Maternal Grandmother          age 50 brain tumor         Current Outpatient Medications   Medication Sig Dispense Refill    alcohol swab prep pads Use to swab area of injection/leticia as directed. 100 each 3    atorvastatin (LIPITOR) 20 MG tablet Take 1 tablet (20 mg) by mouth daily. 90 tablet 3    blood glucose (NO BRAND SPECIFIED) test strip 1 strip by In Vitro route 2 times daily Use to test blood sugar 2 times daily AM and after dinner 100 strip 3    blood glucose calibration (NO BRAND SPECIFIED) solution To accompany: Blood Glucose Monitor Brands: per insurance. 2 each 0    blood glucose monitoring (ACCU-CHEK FASTCLIX) lancets Use to test blood sugar in the AM and after Dinner 102 each 3    blood glucose monitoring (NO BRAND SPECIFIED) meter device kit Use to test blood sugar 2 times daily or as directed. Preferred blood glucose meter OR supplies to accompany: Blood Glucose Monitor Brands: per insurance. 1 kit 0    glipiZIDE (GLUCOTROL XL) 10 MG 24 hr tablet Take 1 tablet (10 mg) by mouth daily. 90 tablet 0    metFORMIN (GLUCOPHAGE XR) 500 MG 24 hr tablet Take 2 tablets (1,000 mg) by mouth 2 times daily (with meals). 360 tablet 3    sitagliptin (JANUVIA) 100 MG tablet Take 1 tablet (100 mg) by mouth daily. 90 tablet 3    blood glucose monitoring (NO BRAND SPECIFIED) test strip Use to test blood sugar 2 times daily or as directed. (Patient not taking: Reported on 2022) 100 each 12    blood glucose monitoring (ONE TOUCH DELICA) lancets Use to test blood sugars 1 times daily or as directed. (Patient not taking: Reported on 2022) 1 Box 12    blood glucose monitoring (ONE TOUCH VERIO IQ) test strip Use to test blood sugar 2 times daily or as directed.  Ok to substitute alternative if insurance prefers. (Patient not taking: Reported on 2022) 100 strip 11    ORDER FOR [facility] Superfeet inserts size 9  "(Patient not taking: Reported on 4/8/2022) 1 Device 0     No Known Allergies  Recent Labs   Lab Test 10/22/24  0910 08/11/23  0834 07/07/23  1731 04/08/22  0838 08/31/20  1057 05/14/19  0844 12/03/18  0835 11/10/17  0836   A1C 9.5* 8.3*  --  8.9* 9.4*   < > 9.0* 8.7*   LDL  --  111*  --  140* Cannot estimate LDL when triglyceride exceeds 400 mg/dL  73  --  Cannot estimate LDL when triglyceride exceeds 400 mg/dL  82 68   HDL  --  38*  --  38* 38*  --  35* 38*   TRIG  --  482*  --  725* 620*  --  484* 300*   ALT  --   --  47 28  --   --  45  --    CR  --   --  0.63* 0.60* 0.56*  --  0.58*  --    GFRESTIMATED  --   --  >90 >90 >90  --  >90  --    GFRESTBLACK  --   --   --   --  >90  --  >90  --    POTASSIUM  --   --  4.1 4.2 4.1  --  4.0  --    TSH  --   --   --   --   --   --  0.55 0.78    < > = values in this interval not displayed.          Review of Systems  Constitutional, neuro, ENT, endocrine, pulmonary, cardiac, gastrointestinal, genitourinary, musculoskeletal, integument and psychiatric systems are negative, except as otherwise noted.     Objective    Exam  /76   Pulse 69   Temp 97  F (36.1  C) (Tympanic)   Resp 16   Ht 1.651 m (5' 5\")   Wt 69.7 kg (153 lb 9.6 oz)   SpO2 98%   BMI 25.56 kg/m     Estimated body mass index is 25.56 kg/m  as calculated from the following:    Height as of this encounter: 1.651 m (5' 5\").    Weight as of this encounter: 69.7 kg (153 lb 9.6 oz).    Physical Exam  GENERAL: alert and no distress  EYES: Eyes grossly normal to inspection, PERRL and conjunctivae and sclerae normal  HENT: ear canals and TM's normal, nose and mouth without ulcers or lesions  NECK: no adenopathy, no asymmetry, masses, or scars  RESP: lungs clear to auscultation - no rales, rhonchi or wheezes  CV: regular rate and rhythm, normal S1 S2, no S3 or S4, no murmur, click or rub, no peripheral edema  ABDOMEN: soft, nontender, no hepatosplenomegaly, no masses and bowel sounds normal  MS: no gross " musculoskeletal defects noted, no edema  SKIN: no suspicious lesions or rashes  NEURO: Normal strength and tone, mentation intact and speech normal  PSYCH: mentation appears normal, affect normal/bright      Signed Electronically by: MELANIE Hameed CNP

## 2025-04-10 ENCOUNTER — PATIENT OUTREACH (OUTPATIENT)
Dept: FAMILY MEDICINE | Facility: CLINIC | Age: 50
End: 2025-04-10
Payer: COMMERCIAL

## 2025-04-10 NOTE — TELEPHONE ENCOUNTER
Patient Quality Outreach    Patient is due for the following:   Diabetes -  A1C and Diabetic Follow-Up Visit    Action(s) Taken:   Schedule a office visit for Diabetes    Type of outreach:    Sent [employer] message.    Questions for provider review:    None         Gloria Barboza MA  Chart routed to None.

## 2025-05-10 ENCOUNTER — HEALTH MAINTENANCE LETTER (OUTPATIENT)
Age: 50
End: 2025-05-10

## 2025-06-30 DIAGNOSIS — E11.9 TYPE 2 DIABETES MELLITUS WITHOUT COMPLICATION, WITHOUT LONG-TERM CURRENT USE OF INSULIN (H): ICD-10-CM

## 2025-06-30 RX ORDER — GLIPIZIDE 10 MG/1
10 TABLET, FILM COATED, EXTENDED RELEASE ORAL DAILY
Qty: 90 TABLET | Refills: 0 | Status: SHIPPED | OUTPATIENT
Start: 2025-06-30

## 2025-07-01 NOTE — TELEPHONE ENCOUNTER
CLAUDETTE med was fill and that he needs to call clinic to schedule office visit or schedule on mychart before next fill  Ijeoma Mike CMA

## 2025-07-17 ENCOUNTER — TELEPHONE (OUTPATIENT)
Dept: FAMILY MEDICINE | Facility: CLINIC | Age: 50
End: 2025-07-17
Payer: COMMERCIAL

## 2025-07-17 NOTE — TELEPHONE ENCOUNTER
Patient Quality Outreach    Patient is due for the following:   Diabetes -  A1C, Eye Exam, Microalbumin, Diabetic Follow-Up Visit, and Foot Exam    Action(s) Taken:   Schedule a office visit for diabetes    Type of outreach:    Sent [employer] message.    Questions for provider review:    None         Gloria Barboza MA  Chart routed to None.